# Patient Record
Sex: FEMALE | Race: WHITE | HISPANIC OR LATINO | URBAN - METROPOLITAN AREA
[De-identification: names, ages, dates, MRNs, and addresses within clinical notes are randomized per-mention and may not be internally consistent; named-entity substitution may affect disease eponyms.]

---
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
## 2022-04-11 ENCOUNTER — HISTORICAL (OUTPATIENT)
Dept: ADMINISTRATIVE | Facility: HOSPITAL | Age: 22
End: 2022-04-11

## 2022-04-27 VITALS
DIASTOLIC BLOOD PRESSURE: 68 MMHG | HEIGHT: 60 IN | WEIGHT: 188 LBS | SYSTOLIC BLOOD PRESSURE: 118 MMHG | BODY MASS INDEX: 36.91 KG/M2

## 2022-05-06 ENCOUNTER — HISTORICAL (OUTPATIENT)
Dept: ADMINISTRATIVE | Facility: HOSPITAL | Age: 22
End: 2022-05-06

## 2022-08-19 LAB
CHLAMYDIA BY PCR: NEGATIVE
M GENITALIUM DNA SPEC QL NAA+PROBE: NEGATIVE
NEISSERIA GONORRHEA BY PCR: NEGATIVE
TRICHOMONAS: NEGATIVE

## 2022-09-22 ENCOUNTER — HISTORICAL (OUTPATIENT)
Dept: ADMINISTRATIVE | Facility: HOSPITAL | Age: 22
End: 2022-09-22

## 2023-02-03 RX ORDER — MEDROXYPROGESTERONE ACETATE 150 MG/ML
INJECTION, SUSPENSION INTRAMUSCULAR
COMMUNITY
End: 2023-04-25

## 2023-02-07 ENCOUNTER — CLINICAL SUPPORT (OUTPATIENT)
Dept: OBSTETRICS AND GYNECOLOGY | Facility: CLINIC | Age: 23
End: 2023-02-07
Payer: COMMERCIAL

## 2023-02-07 VITALS
DIASTOLIC BLOOD PRESSURE: 72 MMHG | BODY MASS INDEX: 34.36 KG/M2 | TEMPERATURE: 98 F | HEIGHT: 64 IN | WEIGHT: 201.25 LBS | SYSTOLIC BLOOD PRESSURE: 118 MMHG

## 2023-02-07 DIAGNOSIS — Z30.9 ENCOUNTER FOR CONTRACEPTIVE MANAGEMENT, UNSPECIFIED TYPE: Primary | ICD-10-CM

## 2023-02-07 DIAGNOSIS — L72.9 CYST OF BUTTOCKS: ICD-10-CM

## 2023-02-07 PROCEDURE — 96372 PR INJECTION,THERAP/PROPH/DIAG2ST, IM OR SUBCUT: ICD-10-PCS | Mod: ,,, | Performed by: NURSE PRACTITIONER

## 2023-02-07 PROCEDURE — 99213 PR OFFICE/OUTPT VISIT, EST, LEVL III, 20-29 MIN: ICD-10-PCS | Mod: 25,,, | Performed by: NURSE PRACTITIONER

## 2023-02-07 PROCEDURE — 99213 OFFICE O/P EST LOW 20 MIN: CPT | Mod: 25,,, | Performed by: NURSE PRACTITIONER

## 2023-02-07 PROCEDURE — 96372 THER/PROPH/DIAG INJ SC/IM: CPT | Mod: ,,, | Performed by: NURSE PRACTITIONER

## 2023-02-07 RX ORDER — MEDROXYPROGESTERONE ACETATE 150 MG/ML
150 INJECTION, SUSPENSION INTRAMUSCULAR
Status: COMPLETED | OUTPATIENT
Start: 2023-02-07 | End: 2023-02-07

## 2023-02-07 RX ADMIN — MEDROXYPROGESTERONE ACETATE 150 MG: 150 INJECTION, SUSPENSION INTRAMUSCULAR at 08:02

## 2023-02-07 NOTE — PROGRESS NOTES
Chief Complaint:     Chief Complaint   Patient presents with    depo and c/o bump on bottom that has been hurting x2 weeks         HPI:   22 y.o.  female    presents for Depo Provera. Last injection 2022.  Patient complains to inner right buttock noted 2 weeks ago.  She is unsure when the bump originally appeared.  She denies pain to the area at this time.          Current Outpatient Medications:     medroxyPROGESTERone (DEPO-PROVERA) 150 mg/mL Syrg, , Disp: , Rfl:   No current facility-administered medications for this visit.    Review of patient's allergies indicates:  No Known Allergies    Social History     Tobacco Use    Smoking status: Never    Smokeless tobacco: Never   Substance Use Topics    Alcohol use: Yes    Drug use: Never       Review of Systems       Physical Exam:   Vitals:    23 0811   BP: 118/72   Temp: 98.2 °F (36.8 °C)     Body mass index is 34.79 kg/m².    Chaperone present.    Constitutional: General appearance, healthy, well-nourished and well-developed.  Psychiatric: Orientation to time, place, and person.         Mood and Affect: normal mood and affect and active, alert.    Buttock, right - 1 cm cyst noted right upper buttock near upper area of intergluteal cleft, firm, no drainage noted    Assessment:     There is no problem list on file for this patient.      Health Maintenance Due   Topic Date Due    Hepatitis C Screening  Never done    Lipid Panel  Never done    HPV Vaccines (1 - 2-dose series) Never done    HIV Screening  Never done    TETANUS VACCINE  Never done    COVID-19 Vaccine (2 - Pfizer series) 2022    Influenza Vaccine (1) 2022     The patient has no Health Maintenance topics of status Not Due        Plan:    Zora was seen today for depo and c/o bump on bottom that has been hurting x2 weeks.    Diagnoses and all orders for this visit:    Encounter for contraceptive management, unspecified type  -     medroxyPROGESTERone (DEPO-PROVERA)  injection 150 mg  RTC 12 weeks for depo provera injection  Cyst of buttocks  Refer to Dr Elvin Palomo for evaluation and management

## 2023-02-09 ENCOUNTER — TELEPHONE (OUTPATIENT)
Dept: OBSTETRICS AND GYNECOLOGY | Facility: CLINIC | Age: 23
End: 2023-02-09
Payer: COMMERCIAL

## 2023-02-09 NOTE — TELEPHONE ENCOUNTER
----- Message from Ericka Evans LPN sent at 2/9/2023  9:10 AM CST -----  Regarding: Referral to Dr. Stacia Molina For cyst to buttock scheduled for 2/20/23 @ 0630.

## 2023-03-10 ENCOUNTER — TELEPHONE (OUTPATIENT)
Dept: OBSTETRICS AND GYNECOLOGY | Facility: CLINIC | Age: 23
End: 2023-03-10
Payer: COMMERCIAL

## 2023-03-10 NOTE — TELEPHONE ENCOUNTER
Called Dr. Chauhan's officeStatus of appt. on 2/20/23 @ 1979 for cyst on buttocks. Radha states patient cancelled appointment and states she did not want to reschedule

## 2023-04-25 ENCOUNTER — CLINICAL SUPPORT (OUTPATIENT)
Dept: OBSTETRICS AND GYNECOLOGY | Facility: CLINIC | Age: 23
End: 2023-04-25
Payer: COMMERCIAL

## 2023-04-25 VITALS
TEMPERATURE: 96 F | SYSTOLIC BLOOD PRESSURE: 118 MMHG | BODY MASS INDEX: 35.83 KG/M2 | WEIGHT: 209.88 LBS | HEIGHT: 64 IN | DIASTOLIC BLOOD PRESSURE: 72 MMHG

## 2023-04-25 DIAGNOSIS — Z30.011 INITIATION OF ORAL CONTRACEPTION: ICD-10-CM

## 2023-04-25 DIAGNOSIS — Z30.9 ENCOUNTER FOR CONTRACEPTIVE MANAGEMENT, UNSPECIFIED TYPE: Primary | ICD-10-CM

## 2023-04-25 LAB
B-HCG UR QL: NEGATIVE
CTP QC/QA: YES

## 2023-04-25 PROCEDURE — 81025 POCT URINE PREGNANCY: ICD-10-PCS | Mod: ,,, | Performed by: NURSE PRACTITIONER

## 2023-04-25 PROCEDURE — 99212 OFFICE O/P EST SF 10 MIN: CPT | Mod: ,,, | Performed by: NURSE PRACTITIONER

## 2023-04-25 PROCEDURE — 81025 URINE PREGNANCY TEST: CPT | Mod: ,,, | Performed by: NURSE PRACTITIONER

## 2023-04-25 PROCEDURE — 99212 PR OFFICE/OUTPT VISIT, EST, LEVL II, 10-19 MIN: ICD-10-PCS | Mod: ,,, | Performed by: NURSE PRACTITIONER

## 2023-04-25 RX ORDER — NORETHINDRONE ACETATE AND ETHINYL ESTRADIOL AND FERROUS FUMARATE 1MG-20(24)
1 KIT ORAL DAILY
Qty: 28 TABLET | Refills: 3 | Status: SHIPPED | OUTPATIENT
Start: 2023-04-25 | End: 2023-05-23

## 2023-04-25 NOTE — PROGRESS NOTES
"Chief Complaint:     Chief Complaint   Patient presents with    Contraception     Discuss stopping DMPA          HPI:   22 y.o.  White female  G0 presents with request to change to pills for birth control.  Has taken pills in past with no problems.  Nonsmoker, denies hx of CVA, MI, TE event.       LMP: Amenorrhea  Frequency: n/a   Cycle Length: n/a days   Flow: n/a   Intermenstrual Bleeding: No  Postcoital Bleeding: No  Dysmenorrhea: No  Sexually Active: Yes    Dyspareunia: No  Contraception: DMPA   H/o STI: HSV  Last pap: N/a  H/o Abnormal Pap: No   Gardasil: no hx    MMG: n/a  H/o abnl MMG      Current Outpatient Medications:     norethindrone-e.estradioL-iron (MINASTRIN 24 FE) 1 mg-20 mcg(24) /75 mg (4) Chew, Take 1 tablet by mouth once daily., Disp: 28 tablet, Rfl: 3    Review of patient's allergies indicates:  No Known Allergies    Social History     Tobacco Use    Smoking status: Never    Smokeless tobacco: Never   Substance Use Topics    Alcohol use: Yes    Drug use: Never       Review of Systems   Constitutional:  Negative for appetite change, chills, fatigue, fever and unexpected weight change.   Gastrointestinal:  Negative for abdominal pain, blood in stool, constipation, diarrhea, nausea, vomiting and reflux.   Genitourinary:  Negative for bladder incontinence, decreased libido, dysmenorrhea, dyspareunia, dysuria, flank pain, frequency, genital sores, hematuria, hot flashes, menorrhagia, menstrual problem, pelvic pain, urgency, vaginal bleeding, vaginal discharge, vaginal pain, urinary incontinence, postcoital bleeding, postmenopausal bleeding, vaginal dryness and vaginal odor.   Integumentary:  Negative for rash, acne, hair changes and mole/lesion.   Neurological:  Negative for headaches.        Physical Exam:   Vitals:    04/25/23 1310   BP: 118/72   Temp: 96.4 °F (35.8 °C)   Weight: 95.2 kg (209 lb 14.1 oz)   Height: 5' 3.78" (1.62 m)       Body mass index is 36.27 kg/m².    Physical " Exam  Constitutional:       Appearance: She is well-developed.   Abdominal:      General: Abdomen is flat.   Neurological:      Mental Status: She is alert and oriented to person, place, and time.   Psychiatric:         Attention and Perception: Attention normal.         Mood and Affect: Mood normal. Mood is not anxious or depressed.           Assessment:     There is no problem list on file for this patient.      Health Maintenance Due   Topic Date Due    Hepatitis C Screening  Never done    HPV Vaccines (1 - 2-dose series) Never done    HIV Screening  Never done    TETANUS VACCINE  Never done    COVID-19 Vaccine (2 - Pfizer series) 08/17/2022    Influenza Vaccine (1) 09/01/2022     Health Maintenance Topics with due status: Not Due       Topic Last Completion Date    Chlamydia Screening 08/19/2022           Plan:    Zora was seen today for contraception.    Diagnoses and all orders for this visit:    Encounter for contraceptive management, unspecified type  -     POCT Urine Pregnancy    Initiation of oral contraception  - Explained common options for contraception including natural family planning, barrier methods, depo-provera, ocps,  patch, nuvaring, IUD, Nexplanon, and sterilization.     - Discussed that pills should be taken at the same time every day to minimize breakthrough bleeding and to decrease failure rate.     - Advised patient that smoking is harmful due to increased risks of stroke, heart attack and blood clots when taking pills. Patient to contact us immediately if she experiences severe abdominal pain, severe chest pain, severe headaches, eye-visual changes, severe leg pain or SOB.     - Discussed that birth control, such as pills, Nuva Ring , Patch or Depo Provera, Nexplanon, IUDs do not protect against STDs.     Begin Minastrin today.  Refills sent to pharmacy.      Other orders  -     norethindrone-e.estradioL-iron (MINASTRIN 24 FE) 1 mg-20 mcg(24) /75 mg (4) Chew; Take 1 tablet by mouth once  daily.

## 2023-07-19 NOTE — PROGRESS NOTES
Po  Chief Complaint:     Chief Complaint   Patient presents with    Contraception     F/u on ocp         HPI:   22 y.o.  Presents to follow up on Minastrin.  C/o nausea, fatigue, lower back pain, irregular bleeding, moodiness, and cramping while taking Minastrin. Pt reports she has taken UPTs at home, all have been negative, last one 3 days ago, last had intercourse x3 days go as well.  Pt desires to restart Depo Provera injections.     LMP: irregular with OCP  Frequency: n/a   Cycle Length: n/a days   Flow: n/a   Intermenstrual Bleeding: yes  Postcoital Bleeding: No  Dysmenorrhea: No  Sexually Active: Yes    Dyspareunia: yes  Contraception: OCP,Minastrin  H/o STI: HSV  Last pap: N/a  H/o Abnormal Pap: No   Gardasil: no hx    MMG: n/a  H/o abnl MMG: n/a      No current outpatient medications on file.  No current facility-administered medications for this visit.    Review of patient's allergies indicates:  No Known Allergies    Social History     Tobacco Use    Smoking status: Never    Smokeless tobacco: Never   Substance Use Topics    Alcohol use: Yes     Comment: ocassional    Drug use: Never       Review of Systems:   General/Constitutional: Chills denies. Fatigue/weakness denies. Fever denies. Night sweats denies. Hot flashes denies    Respiratory: Cough denies. Hemoptysis denies. SOB denies. Sputum production denies. Wheezing denies .   Cardiovascular: Chest pain denies . Dizziness denies. Palpitations denies. Swelling in hands/feet denies    Gastrointestinal: Abdominal pain denies. Blood in stool denies. Constipation denies. Diarrhea denies. Heartburn denies. Nausea denies. Vomiting denies    Genitourinary: Incontinence denies. Blood in urine denies. Frequent urination denies. Painful urination denies. Urinary urgency denies. Nocturia denies    Gynecologic: Irregular menses admits Heavy bleeding denies. Painful menses admits. Vaginal discharge denies. Vaginal odor denies. Vaginal itching denies. Vaginal  lesion denies. Pelvic pain denies. Decreased libido denies. Vulvar lesion denies. Prolapse of genital organs denies. Painful intercourse admits. Postcoital bleeding denies    Psychiatric: Depression denies. Anxiety denies       Physical Exam:   Vitals:    07/20/23 1435   BP: 118/82   BP Location: Right arm   Patient Position: Sitting   Temp: 96.8 °F (36 °C)   Weight: 94.6 kg (208 lb 8.9 oz)   Height: 5' (1.524 m)       Body mass index is 40.73 kg/m².    Constitutional:       Appearance: She is well-developed  Abdominal:       General: Abdomen is flat.  Neurological:        Mental Status: She is alert and oriented to person, place and time.   Psychiatric:       Attention and Perception: Attention normal.       Mood and Affect: Mood normal. Mood is not anxious or depressed.       Assessment:     There is no problem list on file for this patient.      Health Maintenance Due   Topic Date Due    Hepatitis C Screening  Never done    HPV Vaccines (1 - 2-dose series) Never done    HIV Screening  Never done    TETANUS VACCINE  Never done    COVID-19 Vaccine (2 - Pfizer series) 09/21/2022     Health Maintenance Topics with due status: Not Due       Topic Last Completion Date    Influenza Vaccine 09/24/2019    Chlamydia Screening 08/19/2022           Plan:    Zora was seen today for contraception.    Diagnoses and all orders for this visit:    Oral contraceptive use  Discontinue Minastrin    Encounter for contraceptive management, unspecified type  - Explained common options for contraception including natural family planning, barrier methods, depo-provera, ocps,  patch, nuvaring, IUD, Nexplanon, and sterilization.     - Discussed that birth control, such as pills, Nuva Ring , Patch or Depo Provera, Nexplanon, IUDs do not protect against STDs.     Pt desires  Depo provera    Need for HPV vaccination  - HPV is a common viral infection that manifests in some patients as anogenital warts.      - HPV infection is acquired  through direct genital contact.     - Educated she may be at risk for other sexually transmitted diseases     - Advised pt on Gardasil vaccine and correct doses to be administered if pt desires.     Depo Provera Contraceptive status  - DMPA injection given by nurse, tolerated well    - Desires to cont DMPA at this time    - Cont DMPA q 12 weeks    - RTC 3 months for DMPA injection between 10/05/2023 to 10/19/2023

## 2023-07-20 ENCOUNTER — OFFICE VISIT (OUTPATIENT)
Dept: OBSTETRICS AND GYNECOLOGY | Facility: CLINIC | Age: 23
End: 2023-07-20
Payer: COMMERCIAL

## 2023-07-20 VITALS
WEIGHT: 208.56 LBS | TEMPERATURE: 97 F | HEIGHT: 60 IN | DIASTOLIC BLOOD PRESSURE: 82 MMHG | SYSTOLIC BLOOD PRESSURE: 118 MMHG | BODY MASS INDEX: 40.95 KG/M2

## 2023-07-20 DIAGNOSIS — Z30.41 ORAL CONTRACEPTIVE USE: Primary | ICD-10-CM

## 2023-07-20 DIAGNOSIS — Z30.9 ENCOUNTER FOR CONTRACEPTIVE MANAGEMENT, UNSPECIFIED TYPE: ICD-10-CM

## 2023-07-20 DIAGNOSIS — Z30.42 DEPO-PROVERA CONTRACEPTIVE STATUS: ICD-10-CM

## 2023-07-20 DIAGNOSIS — Z23 NEED FOR HPV VACCINATION: ICD-10-CM

## 2023-07-20 PROCEDURE — 3074F SYST BP LT 130 MM HG: CPT | Mod: CPTII,,, | Performed by: NURSE PRACTITIONER

## 2023-07-20 PROCEDURE — 99213 OFFICE O/P EST LOW 20 MIN: CPT | Mod: 25,,, | Performed by: NURSE PRACTITIONER

## 2023-07-20 PROCEDURE — 1159F PR MEDICATION LIST DOCUMENTED IN MEDICAL RECORD: ICD-10-PCS | Mod: CPTII,,, | Performed by: NURSE PRACTITIONER

## 2023-07-20 PROCEDURE — 3079F PR MOST RECENT DIASTOLIC BLOOD PRESSURE 80-89 MM HG: ICD-10-PCS | Mod: CPTII,,, | Performed by: NURSE PRACTITIONER

## 2023-07-20 PROCEDURE — 96372 THER/PROPH/DIAG INJ SC/IM: CPT | Mod: ,,, | Performed by: NURSE PRACTITIONER

## 2023-07-20 PROCEDURE — 96372 PR INJECTION,THERAP/PROPH/DIAG2ST, IM OR SUBCUT: ICD-10-PCS | Mod: ,,, | Performed by: NURSE PRACTITIONER

## 2023-07-20 PROCEDURE — 3079F DIAST BP 80-89 MM HG: CPT | Mod: CPTII,,, | Performed by: NURSE PRACTITIONER

## 2023-07-20 PROCEDURE — 3074F PR MOST RECENT SYSTOLIC BLOOD PRESSURE < 130 MM HG: ICD-10-PCS | Mod: CPTII,,, | Performed by: NURSE PRACTITIONER

## 2023-07-20 PROCEDURE — 1159F MED LIST DOCD IN RCRD: CPT | Mod: CPTII,,, | Performed by: NURSE PRACTITIONER

## 2023-07-20 PROCEDURE — 99213 PR OFFICE/OUTPT VISIT, EST, LEVL III, 20-29 MIN: ICD-10-PCS | Mod: 25,,, | Performed by: NURSE PRACTITIONER

## 2023-07-20 PROCEDURE — 1160F RVW MEDS BY RX/DR IN RCRD: CPT | Mod: CPTII,,, | Performed by: NURSE PRACTITIONER

## 2023-07-20 PROCEDURE — 3008F PR BODY MASS INDEX (BMI) DOCUMENTED: ICD-10-PCS | Mod: CPTII,,, | Performed by: NURSE PRACTITIONER

## 2023-07-20 PROCEDURE — 3008F BODY MASS INDEX DOCD: CPT | Mod: CPTII,,, | Performed by: NURSE PRACTITIONER

## 2023-07-20 PROCEDURE — 1160F PR REVIEW ALL MEDS BY PRESCRIBER/CLIN PHARMACIST DOCUMENTED: ICD-10-PCS | Mod: CPTII,,, | Performed by: NURSE PRACTITIONER

## 2023-07-20 RX ORDER — NORETHINDRONE ACETATE AND ETHINYL ESTRADIOL AND FERROUS FUMARATE 1MG-20(24)
1 KIT ORAL
COMMUNITY
Start: 2023-06-23 | End: 2023-07-20 | Stop reason: SDUPTHER

## 2023-07-20 RX ORDER — MEDROXYPROGESTERONE ACETATE 150 MG/ML
150 INJECTION, SUSPENSION INTRAMUSCULAR
Status: COMPLETED | OUTPATIENT
Start: 2023-07-20 | End: 2023-07-20

## 2023-07-20 RX ORDER — MEDROXYPROGESTERONE ACETATE 150 MG/ML
150 INJECTION, SUSPENSION INTRAMUSCULAR ONCE
Qty: 1 ML | Refills: 0 | Status: SHIPPED | OUTPATIENT
Start: 2023-07-20 | End: 2023-07-20 | Stop reason: CLARIF

## 2023-07-20 RX ADMIN — MEDROXYPROGESTERONE ACETATE 150 MG: 150 INJECTION, SUSPENSION INTRAMUSCULAR at 03:07

## 2023-10-23 ENCOUNTER — OFFICE VISIT (OUTPATIENT)
Dept: OBSTETRICS AND GYNECOLOGY | Facility: CLINIC | Age: 23
End: 2023-10-23
Payer: COMMERCIAL

## 2023-10-23 VITALS
SYSTOLIC BLOOD PRESSURE: 110 MMHG | WEIGHT: 212.5 LBS | HEIGHT: 60 IN | DIASTOLIC BLOOD PRESSURE: 68 MMHG | TEMPERATURE: 97 F | BODY MASS INDEX: 41.72 KG/M2

## 2023-10-23 DIAGNOSIS — Z30.42 DEPO-PROVERA CONTRACEPTIVE STATUS: Primary | ICD-10-CM

## 2023-10-23 LAB
B-HCG UR QL: NEGATIVE
CTP QC/QA: YES

## 2023-10-23 PROCEDURE — 96372 THER/PROPH/DIAG INJ SC/IM: CPT | Mod: ,,, | Performed by: NURSE PRACTITIONER

## 2023-10-23 PROCEDURE — 1160F RVW MEDS BY RX/DR IN RCRD: CPT | Mod: CPTII,,, | Performed by: NURSE PRACTITIONER

## 2023-10-23 PROCEDURE — 81025 POCT URINE PREGNANCY: ICD-10-PCS | Mod: ,,, | Performed by: NURSE PRACTITIONER

## 2023-10-23 PROCEDURE — 96372 PR INJECTION,THERAP/PROPH/DIAG2ST, IM OR SUBCUT: ICD-10-PCS | Mod: ,,, | Performed by: NURSE PRACTITIONER

## 2023-10-23 PROCEDURE — 3008F BODY MASS INDEX DOCD: CPT | Mod: CPTII,,, | Performed by: NURSE PRACTITIONER

## 2023-10-23 PROCEDURE — 1159F MED LIST DOCD IN RCRD: CPT | Mod: CPTII,,, | Performed by: NURSE PRACTITIONER

## 2023-10-23 PROCEDURE — 81025 URINE PREGNANCY TEST: CPT | Mod: ,,, | Performed by: NURSE PRACTITIONER

## 2023-10-23 PROCEDURE — 3078F PR MOST RECENT DIASTOLIC BLOOD PRESSURE < 80 MM HG: ICD-10-PCS | Mod: CPTII,,, | Performed by: NURSE PRACTITIONER

## 2023-10-23 PROCEDURE — 1160F PR REVIEW ALL MEDS BY PRESCRIBER/CLIN PHARMACIST DOCUMENTED: ICD-10-PCS | Mod: CPTII,,, | Performed by: NURSE PRACTITIONER

## 2023-10-23 PROCEDURE — 3078F DIAST BP <80 MM HG: CPT | Mod: CPTII,,, | Performed by: NURSE PRACTITIONER

## 2023-10-23 PROCEDURE — 3074F PR MOST RECENT SYSTOLIC BLOOD PRESSURE < 130 MM HG: ICD-10-PCS | Mod: CPTII,,, | Performed by: NURSE PRACTITIONER

## 2023-10-23 PROCEDURE — 1159F PR MEDICATION LIST DOCUMENTED IN MEDICAL RECORD: ICD-10-PCS | Mod: CPTII,,, | Performed by: NURSE PRACTITIONER

## 2023-10-23 PROCEDURE — 3008F PR BODY MASS INDEX (BMI) DOCUMENTED: ICD-10-PCS | Mod: CPTII,,, | Performed by: NURSE PRACTITIONER

## 2023-10-23 PROCEDURE — 3074F SYST BP LT 130 MM HG: CPT | Mod: CPTII,,, | Performed by: NURSE PRACTITIONER

## 2023-10-23 RX ORDER — MEDROXYPROGESTERONE ACETATE 150 MG/ML
150 INJECTION, SUSPENSION INTRAMUSCULAR
COMMUNITY

## 2023-10-23 RX ORDER — MEDROXYPROGESTERONE ACETATE 150 MG/ML
150 INJECTION, SUSPENSION INTRAMUSCULAR
Status: COMPLETED | OUTPATIENT
Start: 2023-10-23 | End: 2023-10-23

## 2023-10-23 RX ADMIN — MEDROXYPROGESTERONE ACETATE 150 MG: 150 INJECTION, SUSPENSION INTRAMUSCULAR at 10:10

## 2023-10-23 NOTE — PROGRESS NOTES
Chief Complaint:  DMPA (DMPA last injection 23)    History of Present Illness:  Zora is a 23 y.o.  presents for depo provera. Last injection was 23. No cycle with DMPA. Content, desires to continue.       LMP: no cycle w/ depo provera   Frequency: n/a   Cycle Length: n/a  Flow: n/a  Intermenstrual Bleeding: No  Postcoital Bleeding: No  Dysmenorrhea: No  Sexually Active: Yes    Dyspareunia: No  Contraception: DMPA last injection 23  H/o STI: HSV  Last pap: no history  H/o Abnormal Pap: No   Gardasil: 0/3  MMG: n/a  H/o abnl MMG: n/a  Colonoscopy: n/a      Review of Systems:  Patient reports no abdominal pain. She reports no hematuria, no abnormal bleeding, no flank pain, no trouble urinating, no incontinence, no rash, no lesion, no discharge, no vaginal odor, and no vaginal itching.     OB History    Para Term  AB Living   0 0 0 0 0 0   SAB IAB Ectopic Multiple Live Births   0 0 0 0 0      The patient has never been pregnant.      Current Outpatient Medications:     medroxyPROGESTERone (DEPO-PROVERA) 150 mg/mL injection, Inject 150 mg into the muscle every 3 (three) months., Disp: , Rfl:   No current facility-administered medications for this visit.    Physical Exam:  /68   Temp 97.3 °F (36.3 °C)   Ht 5' (1.524 m)   Wt 96.4 kg (212 lb 8.4 oz)   BMI 41.51 kg/m²     Constitutional: General appearance: healthy, well-nourished and well-developed   Psychiatric: Orientation to time, place and person. Normal mood and affect and active, alert       Assessment/Plan:  1. Depo-Provera contraceptive status   Patient desires to depo-provera for contraceptive management       Patient tolerated injection       Bleeding precautions     Discussed that birth control such as oral contraceptives, patch, NuvaRing, or Depo-Provera do not protect against STDs       Follow up in 3mos

## 2023-12-12 ENCOUNTER — OFFICE VISIT (OUTPATIENT)
Dept: FAMILY MEDICINE | Facility: CLINIC | Age: 23
End: 2023-12-12
Payer: COMMERCIAL

## 2023-12-12 VITALS
HEART RATE: 105 BPM | HEIGHT: 60 IN | BODY MASS INDEX: 42.01 KG/M2 | OXYGEN SATURATION: 100 % | DIASTOLIC BLOOD PRESSURE: 70 MMHG | SYSTOLIC BLOOD PRESSURE: 119 MMHG | RESPIRATION RATE: 20 BRPM | TEMPERATURE: 98 F | WEIGHT: 214 LBS

## 2023-12-12 DIAGNOSIS — J02.9 SORE THROAT: ICD-10-CM

## 2023-12-12 DIAGNOSIS — Z02.0 SCHOOL PHYSICAL EXAM: ICD-10-CM

## 2023-12-12 DIAGNOSIS — Z79.899 ON LONG TERM DRUG THERAPY: ICD-10-CM

## 2023-12-12 DIAGNOSIS — E55.9 VITAMIN D DEFICIENCY: ICD-10-CM

## 2023-12-12 DIAGNOSIS — J32.9 SINUSITIS, UNSPECIFIED CHRONICITY, UNSPECIFIED LOCATION: Primary | ICD-10-CM

## 2023-12-12 DIAGNOSIS — Z13.9 SCREENING DUE: ICD-10-CM

## 2023-12-12 DIAGNOSIS — Z83.438 FAMILY HISTORY OF HYPERLIPIDEMIA: ICD-10-CM

## 2023-12-12 LAB
CTP QC/QA: YES
CTP QC/QA: YES
S PYO RRNA THROAT QL PROBE: NEGATIVE
SARS-COV-2 AG RESP QL IA.RAPID: NEGATIVE

## 2023-12-12 PROCEDURE — 3074F SYST BP LT 130 MM HG: CPT | Mod: CPTII,,, | Performed by: NURSE PRACTITIONER

## 2023-12-12 PROCEDURE — 3078F PR MOST RECENT DIASTOLIC BLOOD PRESSURE < 80 MM HG: ICD-10-PCS | Mod: CPTII,,, | Performed by: NURSE PRACTITIONER

## 2023-12-12 PROCEDURE — 87880 STREP A ASSAY W/OPTIC: CPT | Mod: QW,,, | Performed by: NURSE PRACTITIONER

## 2023-12-12 PROCEDURE — 1159F PR MEDICATION LIST DOCUMENTED IN MEDICAL RECORD: ICD-10-PCS | Mod: CPTII,,, | Performed by: NURSE PRACTITIONER

## 2023-12-12 PROCEDURE — 99213 OFFICE O/P EST LOW 20 MIN: CPT | Mod: ,,, | Performed by: NURSE PRACTITIONER

## 2023-12-12 PROCEDURE — 87811 SARS-COV-2 COVID19 W/OPTIC: CPT | Mod: QW,,, | Performed by: NURSE PRACTITIONER

## 2023-12-12 PROCEDURE — 87811 SARS CORONAVIRUS 2 ANTIGEN POCT, MANUAL READ: ICD-10-PCS | Mod: QW,,, | Performed by: NURSE PRACTITIONER

## 2023-12-12 PROCEDURE — 1160F PR REVIEW ALL MEDS BY PRESCRIBER/CLIN PHARMACIST DOCUMENTED: ICD-10-PCS | Mod: CPTII,,, | Performed by: NURSE PRACTITIONER

## 2023-12-12 PROCEDURE — 86580 TB INTRADERMAL TEST: CPT | Mod: ,,, | Performed by: NURSE PRACTITIONER

## 2023-12-12 PROCEDURE — 86580 POCT TB SKIN TEST: ICD-10-PCS | Mod: ,,, | Performed by: NURSE PRACTITIONER

## 2023-12-12 PROCEDURE — 3008F PR BODY MASS INDEX (BMI) DOCUMENTED: ICD-10-PCS | Mod: CPTII,,, | Performed by: NURSE PRACTITIONER

## 2023-12-12 PROCEDURE — 3008F BODY MASS INDEX DOCD: CPT | Mod: CPTII,,, | Performed by: NURSE PRACTITIONER

## 2023-12-12 PROCEDURE — 3074F PR MOST RECENT SYSTOLIC BLOOD PRESSURE < 130 MM HG: ICD-10-PCS | Mod: CPTII,,, | Performed by: NURSE PRACTITIONER

## 2023-12-12 PROCEDURE — 1160F RVW MEDS BY RX/DR IN RCRD: CPT | Mod: CPTII,,, | Performed by: NURSE PRACTITIONER

## 2023-12-12 PROCEDURE — 99213 PR OFFICE/OUTPT VISIT, EST, LEVL III, 20-29 MIN: ICD-10-PCS | Mod: ,,, | Performed by: NURSE PRACTITIONER

## 2023-12-12 PROCEDURE — 87880 POCT RAPID STREP A: ICD-10-PCS | Mod: QW,,, | Performed by: NURSE PRACTITIONER

## 2023-12-12 PROCEDURE — 3078F DIAST BP <80 MM HG: CPT | Mod: CPTII,,, | Performed by: NURSE PRACTITIONER

## 2023-12-12 PROCEDURE — 1159F MED LIST DOCD IN RCRD: CPT | Mod: CPTII,,, | Performed by: NURSE PRACTITIONER

## 2023-12-12 RX ORDER — AMOXICILLIN 875 MG/1
875 TABLET, FILM COATED ORAL EVERY 12 HOURS
Qty: 14 TABLET | Refills: 0 | Status: SHIPPED | OUTPATIENT
Start: 2023-12-12 | End: 2023-12-19

## 2023-12-12 RX ORDER — PSEUDOEPHEDRINE HYDROCHLORIDE 60 MG/1
60 TABLET ORAL EVERY 6 HOURS PRN
Qty: 30 TABLET | Refills: 0 | Status: SHIPPED | OUTPATIENT
Start: 2023-12-12 | End: 2023-12-22

## 2023-12-12 NOTE — PROGRESS NOTES
SUBJECTIVE:  Zora Gallardo is a 23 y.o. female here alone for Cough (Pt here due to sinus pressure, sore throat and cough. Also needs TB test )      HPI  Patient presents for a work physical with Tdap and TB test. She is getting Tdap vaccine at [facility] on Friday. She is with no history of asthma, heart conditions, GI issues, or other pulmonary issues. She does wear glasses. UTD on eye exam. UTD on gyn exam. Taking only depo shot with no negative side effects. She does work nights and does have sleeping problems.     zora's allergies, medications, history, and problem list were updated as appropriate.    Review of Systems   Constitutional:  Positive for fatigue.      A comprehensive review of symptoms was completed and negative except as noted above.    OBJECTIVE:  Vital signs  Vitals:    12/12/23 1253   BP: 119/70   BP Location: Right arm   Patient Position: Sitting   Pulse: 105   Resp: 20   Temp: 97.9 °F (36.6 °C)   SpO2: 100%   Weight: 97.1 kg (214 lb)   Height: 5' (1.524 m)        Physical Exam  Vitals and nursing note reviewed.   Constitutional:       Appearance: Normal appearance.   HENT:      Head: Normocephalic and atraumatic.      Right Ear: Tympanic membrane, ear canal and external ear normal.      Left Ear: Tympanic membrane, ear canal and external ear normal.      Nose: Nose normal.      Mouth/Throat:      Mouth: Mucous membranes are moist.      Pharynx: Oropharynx is clear.   Eyes:      Extraocular Movements: Extraocular movements intact.      Conjunctiva/sclera: Conjunctivae normal.      Pupils: Pupils are equal, round, and reactive to light.   Cardiovascular:      Rate and Rhythm: Normal rate and regular rhythm.      Pulses: Normal pulses.      Heart sounds: Normal heart sounds.   Pulmonary:      Effort: Pulmonary effort is normal.      Breath sounds: Normal breath sounds.   Abdominal:      General: Abdomen is flat. Bowel sounds are normal.      Palpations: Abdomen is soft.    Musculoskeletal:         General: Normal range of motion.      Cervical back: Normal range of motion.   Skin:     General: Skin is warm and dry.      Capillary Refill: Capillary refill takes less than 2 seconds.   Neurological:      General: No focal deficit present.      Mental Status: She is alert.   Psychiatric:         Mood and Affect: Mood normal.         Behavior: Behavior normal.         Thought Content: Thought content normal.         Judgment: Judgment normal.          Office Visit on 12/12/2023   Component Date Value Ref Range Status    Rapid Strep A Screen 12/12/2023 Negative  Negative, Positive Slide, Positive Tube Final     Acceptable 12/12/2023 Yes   Final    SARS Coronavirus 2 Antigen 12/12/2023 Negative  Negative Final     Acceptable 12/12/2023 Yes   Final          ASSESSMENT/PLAN:    1. Sinusitis, unspecified chronicity, unspecified location  Assessment & Plan:  Covid and strep negative, refused flu   Sudogest and ibuprofen and tylenol prn   Amoxicillin if no improvement within 2-3days    Orders:  -     pseudoephedrine (SUDOGEST) 60 MG tablet; Take 1 tablet (60 mg total) by mouth every 6 (six) hours as needed for Congestion.  Dispense: 30 tablet; Refill: 0  -     amoxicillin (AMOXIL) 875 MG tablet; Take 1 tablet (875 mg total) by mouth every 12 (twelve) hours. for 7 days  Dispense: 14 tablet; Refill: 0    2. School physical exam  Assessment & Plan:  Return on Thursday read TB test   Healthy female   No medication with exception to birth control   Getting Tdap on Friday         3. Sore throat  -     POCT Rapid Strep A  -     SARS Coronavirus 2 Antigen, POCT Manual Read    4. Screening due  -     Drug Screen, Urine          Recent Results (from the past 24 hour(s))   POCT Rapid Strep A    Collection Time: 12/12/23  1:16 PM   Result Value Ref Range    Rapid Strep A Screen Negative Negative, Positive Slide, Positive Tube     Acceptable Yes    SARS  Coronavirus 2 Antigen, POCT Manual Read    Collection Time: 12/12/23  1:16 PM   Result Value Ref Range    SARS Coronavirus 2 Antigen Negative Negative     Acceptable Yes      Cbc, cmp, flp, tsh, A1c, vitamin d, urine drug screening on Thursday at hospital     Follow Up:  Follow up if symptoms worsen or fail to improve.      Discussed the diagnosis, prognosis, plan of care, chronic nature of and indications for, risks and benefits of treatment for conditions.  Continue all medications as prescribed unless otherwise noted.   Call if patient develops other symptoms or if not better in 2-3 days and sooner if worse. Emphasized the importance of compliance with all recommendations, including medication use and timely follow up. Instructed to return as noted be or sooner if patient develops any other problems or if there are any other additional questions or concerns.

## 2023-12-12 NOTE — LETTER
December 14, 2023      [facility]  [address] [place] [id number]  Phone: [phone]  Fax: [phone]       Patient: Zora Gallardo   YOB: 2000  Date of Visit: 12/14/2023    To Whom It May Concern:    lito Gallardo  was at [facility] on 12/14/2023. The patient may return to work on 12/18/2023 with no restrictions. If you have any questions or concerns, or if I can be of further assistance, please do not hesitate to contact me.    Sincerely,    Mae Camp LPN

## 2023-12-12 NOTE — ASSESSMENT & PLAN NOTE
Covid and strep negative, refused flu   Sudogest and ibuprofen and tylenol prn   Amoxicillin if no improvement within 2-3days

## 2023-12-12 NOTE — ASSESSMENT & PLAN NOTE
Return on Thursday read TB test   Healthy female   No medication with exception to birth control   Getting Tdap on Friday

## 2024-01-11 NOTE — PROGRESS NOTES
Chief Complaint:     Chief Complaint   Patient presents with    Contraception     Has cycle when time for DEPO LMP 1/10/24.          HPI:   23 y.o.  F   presents for Depo Provera. Last injection 10/23/2023. States has a 4 day cycle when injection is due.     LMP: 1/10/2024   Frequency: Every 3 months  Cycle Length: 4  Flow: Moderate  Intermenstrual Bleeding: No  Postcoital Bleeding: No  Dysmenorrhea: No  Sexually Active: Yes    Dyspareunia: No  Contraception: DMPA last injection 2024  H/o STI: HSV  Last pap: no history  H/o Abnormal Pap: No   Gardasil: 0/3  MMG: n/a  H/o abnl MMG: n/a  Colonoscopy: n/a        Current Outpatient Medications:     medroxyPROGESTERone (DEPO-PROVERA) 150 mg/mL injection, Inject 150 mg into the muscle every 3 (three) months., Disp: , Rfl:     Review of patient's allergies indicates:  No Known Allergies    Social History     Tobacco Use    Smoking status: Every Day     Types: Vaping with nicotine    Smokeless tobacco: Never   Substance Use Topics    Alcohol use: Yes     Comment: ocassional    Drug use: Never         Review of Systems:  General/Constitutional:  Chills denies. Fatigue/weakness denies. Fever denies . Night sweats denies .  Gastrointestinal:  Abdominal pain denies. Blood in stool denies. Constipation denies. Diarrhea denies. Heartburn denies. Nausea denies. Vomiting denies  Genitourinary:  Incontinence denies. Blood in urine denies. Frequent urination denies. Urgency denies. Painful urination denies.  Gynecologic:  Irregular menses denies. Heavy bleeding denies. Painful menses denies. Vaginal discharge denies. Vaginal odor denies. Vaginal lesion denies. Pelvic pain denies. Decreased libido denies. Vulvar lesion denies. Prolapse of genital organs denies. Painful intercourse denies.      Physical Exam:   Vitals:    24 1205   BP: 120/70     Body mass index is 42.11 kg/m².    Chaperone present.    Constitutional: General appearance, healthy, well-nourished and  well-developed.  Psychiatric: Orientation to time, place, and person.         Mood and Affect: normal mood and affect and active, alert.    Assessment:     Patient Active Problem List   Diagnosis    Sinusitis    School physical exam       Health Maintenance Due   Topic Date Due    Hepatitis C Screening  Never done    Pneumococcal Vaccines (Age 0-64) (1 - PCV) 10/18/2008    HPV Vaccines (1 - 2-dose series) Never done    HIV Screening  Never done    TETANUS VACCINE  Never done    Chlamydia Screening  08/19/2023    Pap Smear  Never done     The patient has no Health Maintenance topics of status Not Due        Plan:    Zora was seen today for contraception.    Diagnoses and all orders for this visit:    Encounter for management and injection of depo-Provera  - DMPA injection given by nurse, tolerated well    - Desires to cont DMPA at this time    - Cont DMPA q 12 weeks    - RTC 3 months for DMPA injection

## 2024-01-12 ENCOUNTER — OFFICE VISIT (OUTPATIENT)
Dept: OBSTETRICS AND GYNECOLOGY | Facility: CLINIC | Age: 24
End: 2024-01-12
Payer: COMMERCIAL

## 2024-01-12 VITALS
DIASTOLIC BLOOD PRESSURE: 70 MMHG | SYSTOLIC BLOOD PRESSURE: 120 MMHG | HEIGHT: 60 IN | WEIGHT: 215.63 LBS | BODY MASS INDEX: 42.33 KG/M2

## 2024-01-12 DIAGNOSIS — Z30.42 ENCOUNTER FOR MANAGEMENT AND INJECTION OF DEPO-PROVERA: Primary | ICD-10-CM

## 2024-01-12 PROCEDURE — 96372 THER/PROPH/DIAG INJ SC/IM: CPT | Mod: ,,, | Performed by: NURSE PRACTITIONER

## 2024-01-12 PROCEDURE — 3078F DIAST BP <80 MM HG: CPT | Mod: CPTII,,, | Performed by: NURSE PRACTITIONER

## 2024-01-12 PROCEDURE — 3008F BODY MASS INDEX DOCD: CPT | Mod: CPTII,,, | Performed by: NURSE PRACTITIONER

## 2024-01-12 PROCEDURE — 3074F SYST BP LT 130 MM HG: CPT | Mod: CPTII,,, | Performed by: NURSE PRACTITIONER

## 2024-01-12 PROCEDURE — 1160F RVW MEDS BY RX/DR IN RCRD: CPT | Mod: CPTII,,, | Performed by: NURSE PRACTITIONER

## 2024-01-12 PROCEDURE — 1159F MED LIST DOCD IN RCRD: CPT | Mod: CPTII,,, | Performed by: NURSE PRACTITIONER

## 2024-01-12 RX ORDER — MEDROXYPROGESTERONE ACETATE 150 MG/ML
150 INJECTION, SUSPENSION INTRAMUSCULAR
Status: COMPLETED | OUTPATIENT
Start: 2024-01-12 | End: 2024-01-12

## 2024-01-12 RX ORDER — MEDROXYPROGESTERONE ACETATE 150 MG/ML
150 INJECTION, SUSPENSION INTRAMUSCULAR ONCE
Qty: 1 ML | Refills: 0 | Status: SHIPPED | OUTPATIENT
Start: 2024-01-12 | End: 2024-01-12 | Stop reason: CLARIF

## 2024-01-12 RX ADMIN — MEDROXYPROGESTERONE ACETATE 150 MG: 150 INJECTION, SUSPENSION INTRAMUSCULAR at 12:01

## 2024-03-20 NOTE — PROGRESS NOTES
Chief Complaint:     No chief complaint on file.        HPI:   23 y.o.  F   presents for Depo Provera. Last injection 2024.      LMP: 1/10/2024   Frequency: Every 3 months  Cycle Length: 4  Flow: Moderate  Intermenstrual Bleeding: No  Postcoital Bleeding: No  Dysmenorrhea: No  Sexually Active: Yes    Dyspareunia: No  Contraception: DMPA last injection 2024  H/o STI: HSV  Last pap: no history  H/o Abnormal Pap: No   Garda        Current Outpatient Medications:     medroxyPROGESTERone (DEPO-PROVERA) 150 mg/mL injection, Inject 150 mg into the muscle every 3 (three) months., Disp: , Rfl:     Review of patient's allergies indicates:  No Known Allergies    Social History     Tobacco Use    Smoking status: Every Day     Types: Vaping with nicotine    Smokeless tobacco: Never   Substance Use Topics    Alcohol use: Yes     Comment: ocassional    Drug use: Never         Review of Systems:  General/Constitutional:  Chills denies. Fatigue/weakness denies. Fever denies . Night sweats denies .  Gastrointestinal:  Abdominal pain denies. Blood in stool denies. Constipation denies. Diarrhea denies. Heartburn denies. Nausea denies. Vomiting denies  Genitourinary:  Incontinence denies. Blood in urine denies. Frequent urination denies. Urgency denies. Painful urination denies.  Gynecologic:  Irregular menses denies. Heavy bleeding denies. Painful menses denies. Vaginal discharge denies. Vaginal odor denies. Vaginal lesion denies. Pelvic pain denies. Decreased libido denies. Vulvar lesion denies. Prolapse of genital organs denies. Painful intercourse denies.      Physical Exam:   There were no vitals filed for this visit.  There is no height or weight on file to calculate BMI.    Chaperone present.    Constitutional: General appearance, healthy, well-nourished and well-developed.  Psychiatric: Orientation to time, place, and person.         Mood and Affect: normal mood and affect and active, alert.    Assessment:      Patient Active Problem List   Diagnosis    Sinusitis    School physical exam       Health Maintenance Due   Topic Date Due    Hepatitis C Screening  Never done    Pneumococcal Vaccines (Age 0-64) (1 of 2 - PCV) 10/18/2008    HPV Vaccines (1 - 2-dose series) Never done    HIV Screening  Never done    Chlamydia Screening  08/19/2023    Pap Smear  Never done     Health Maintenance Topics with due status: Not Due       Topic Last Completion Date    TETANUS VACCINE 12/15/2023           Plan:    There are no diagnoses linked to this encounter.

## 2024-04-01 ENCOUNTER — CLINICAL SUPPORT (OUTPATIENT)
Dept: OBSTETRICS AND GYNECOLOGY | Facility: CLINIC | Age: 24
End: 2024-04-01
Payer: COMMERCIAL

## 2024-04-01 VITALS
WEIGHT: 216 LBS | SYSTOLIC BLOOD PRESSURE: 116 MMHG | HEIGHT: 60 IN | TEMPERATURE: 96 F | DIASTOLIC BLOOD PRESSURE: 70 MMHG | BODY MASS INDEX: 42.41 KG/M2

## 2024-04-01 DIAGNOSIS — Z30.9 ENCOUNTER FOR CONTRACEPTIVE MANAGEMENT, UNSPECIFIED TYPE: Primary | ICD-10-CM

## 2024-04-01 PROCEDURE — 96372 THER/PROPH/DIAG INJ SC/IM: CPT | Mod: ,,, | Performed by: NURSE PRACTITIONER

## 2024-04-01 RX ORDER — MEDROXYPROGESTERONE ACETATE 150 MG/ML
150 INJECTION, SUSPENSION INTRAMUSCULAR
Status: COMPLETED | OUTPATIENT
Start: 2024-04-01 | End: 2024-04-01

## 2024-04-01 RX ADMIN — MEDROXYPROGESTERONE ACETATE 150 MG: 150 INJECTION, SUSPENSION INTRAMUSCULAR at 01:04

## 2024-04-01 NOTE — PROGRESS NOTES
Chief Complaint:     Chief Complaint   Patient presents with    Contraception     Depo 2024         HPI:   23 y.o.  presents last depo 2024. Amenorrhea with Depo. No complaints.    LMP: silent menses w/ DMPA  Frequency: n/a  Cycle Length: n/a  Flow: light  Intermenstrual Bleeding: No  Postcoital Bleeding: No  Dysmenorrhea: No  Sexually Active: Yes    Dyspareunia: No  Contraception: DMPA last injection 2024  H/o STI: HSV  Last pap: no history  H/o Abnormal Pap: No   [place]        Current Outpatient Medications:     medroxyPROGESTERone (DEPO-PROVERA) 150 mg/mL injection, Inject 150 mg into the muscle every 3 (three) months., Disp: , Rfl:   No current facility-administered medications for this visit.    Review of patient's allergies indicates:  No Known Allergies    Social History     Tobacco Use    Smoking status: Former     Types: Vaping with nicotine     Start date:      Quit date: 2024     Years since quittin.2     Passive exposure: Current    Smokeless tobacco: Never   Substance Use Topics    Alcohol use: Yes     Comment: ocassional    Drug use: Never       Review of Systems:   General/Constitutional: Chills denies. Fatigue/weakness denies. Fever denies. Night sweats denies. Hot flashes denies    Respiratory: Cough denies. Hemoptysis denies. SOB denies. Sputum production denies. Wheezing denies .   Cardiovascular: Chest pain denies . Dizziness denies. Palpitations denies. Swelling in hands/feet denies    Gastrointestinal: Abdominal pain denies. Blood in stool denies. Constipation denies. Diarrhea denies. Heartburn denies. Nausea denies. Vomiting denies    Genitourinary: Incontinence denies. Blood in urine denies. Frequent urination denies. Painful urination denies. Urinary urgency denies. Nocturia denies    Gynecologic: Irregular menses denies. Heavy bleeding denies. Painful menses denies. Vaginal discharge denies. Vaginal odor denies. Vaginal itching denies. Vaginal lesion  denies. Pelvic pain denies. Decreased libido denies. Vulvar lesion denies. Prolapse of genital organs denies. Painful intercourse denies. Postcoital bleeding denies    Psychiatric: Depression denies. Anxiety denies       Physical Exam:   Vitals:    04/01/24 1323   BP: 116/70   BP Location: Left arm   Patient Position: Sitting   BP Method: Medium (Manual)   Temp: 96.4 °F (35.8 °C)   TempSrc: Temporal   Weight: 98 kg (216 lb)   Height: 5' (1.524 m)       Body mass index is 42.18 kg/m².    Constitutional:       Appearance: She is well-developed  Abdominal:       General: Abdomen is flat.  Neurological:        Mental Status: She is alert and oriented to person, place and time.   Psychiatric:       Attention and Perception: Attention normal.       Mood and Affect: Mood normal. Mood is not anxious or depressed.       Assessment:     Patient Active Problem List   Diagnosis    Sinusitis    School physical exam       Health Maintenance Due   Topic Date Due    Hepatitis C Screening  Never done    Pneumococcal Vaccines (Age 0-64) (1 of 2 - PCV) 10/18/2008    HPV Vaccines (1 - 2-dose series) Never done    HIV Screening  Never done    Chlamydia Screening  08/19/2023    Pap Smear  Never done     Health Maintenance Topics with due status: Not Due       Topic Last Completion Date    TETANUS VACCINE 12/15/2023           Plan:    Zora was seen today for contraception.    Diagnoses and all orders for this visit:    Encounter for contraceptive management, unspecified type  -     medroxyPROGESTERone (DEPO-PROVERA) injection 150 mg    Administered in right arm. No adverse reaction. Return to clinic in 12 weeks. No complaints noted. Pt tolerated well.

## 2024-06-18 ENCOUNTER — OFFICE VISIT (OUTPATIENT)
Dept: OBSTETRICS AND GYNECOLOGY | Facility: CLINIC | Age: 24
End: 2024-06-18
Payer: COMMERCIAL

## 2024-06-18 VITALS
TEMPERATURE: 97 F | WEIGHT: 219 LBS | HEIGHT: 60 IN | BODY MASS INDEX: 43 KG/M2 | DIASTOLIC BLOOD PRESSURE: 68 MMHG | SYSTOLIC BLOOD PRESSURE: 124 MMHG

## 2024-06-18 DIAGNOSIS — Z30.9 ENCOUNTER FOR CONTRACEPTIVE MANAGEMENT, UNSPECIFIED TYPE: ICD-10-CM

## 2024-06-18 DIAGNOSIS — Z30.8 ENCOUNTER FOR OTHER CONTRACEPTIVE MANAGEMENT: ICD-10-CM

## 2024-06-18 DIAGNOSIS — Z01.411 ABNORMAL GYNECOLOGICAL EXAMINATION: Primary | ICD-10-CM

## 2024-06-18 DIAGNOSIS — N91.5 OLIGOMENORRHEA, UNSPECIFIED TYPE: ICD-10-CM

## 2024-06-18 LAB
B-HCG UR QL: NEGATIVE
CTP QC/QA: YES

## 2024-06-18 PROCEDURE — 81025 URINE PREGNANCY TEST: CPT | Mod: ,,, | Performed by: NURSE PRACTITIONER

## 2024-06-18 PROCEDURE — 87591 N.GONORRHOEAE DNA AMP PROB: CPT | Performed by: NURSE PRACTITIONER

## 2024-06-18 PROCEDURE — 87491 CHLMYD TRACH DNA AMP PROBE: CPT | Performed by: NURSE PRACTITIONER

## 2024-06-18 PROCEDURE — 1160F RVW MEDS BY RX/DR IN RCRD: CPT | Mod: CPTII,,, | Performed by: NURSE PRACTITIONER

## 2024-06-18 PROCEDURE — 3008F BODY MASS INDEX DOCD: CPT | Mod: CPTII,,, | Performed by: NURSE PRACTITIONER

## 2024-06-18 PROCEDURE — 87661 TRICHOMONAS VAGINALIS AMPLIF: CPT | Performed by: NURSE PRACTITIONER

## 2024-06-18 PROCEDURE — 3078F DIAST BP <80 MM HG: CPT | Mod: CPTII,,, | Performed by: NURSE PRACTITIONER

## 2024-06-18 PROCEDURE — 1159F MED LIST DOCD IN RCRD: CPT | Mod: CPTII,,, | Performed by: NURSE PRACTITIONER

## 2024-06-18 PROCEDURE — 3074F SYST BP LT 130 MM HG: CPT | Mod: CPTII,,, | Performed by: NURSE PRACTITIONER

## 2024-06-18 PROCEDURE — 99395 PREV VISIT EST AGE 18-39: CPT | Mod: ,,, | Performed by: NURSE PRACTITIONER

## 2024-06-18 RX ORDER — NORELGESTROMIN AND ETHINYL ESTRADIOL 35; 150 UG/MG; UG/MG
1 PATCH TRANSDERMAL WEEKLY
Qty: 3 PATCH | Refills: 3 | Status: SHIPPED | OUTPATIENT
Start: 2024-06-18

## 2024-06-21 LAB
CHLAMYDIA TRACHOMATIS: NEGATIVE
NEISSERIA GONORRHOEAE: NEGATIVE
PSYCHE PATHOLOGY RESULT: NORMAL
TRICHOMONAS VAGINALIS: NEGATIVE

## 2024-06-24 ENCOUNTER — TELEPHONE (OUTPATIENT)
Dept: OBSTETRICS AND GYNECOLOGY | Facility: CLINIC | Age: 24
End: 2024-06-24
Payer: COMMERCIAL

## 2024-06-24 DIAGNOSIS — Z30.9 ENCOUNTER FOR CONTRACEPTIVE MANAGEMENT, UNSPECIFIED TYPE: Primary | ICD-10-CM

## 2024-06-24 RX ORDER — NORETHINDRONE ACETATE AND ETHINYL ESTRADIOL AND FERROUS FUMARATE 1MG-20(24)
1 KIT ORAL DAILY
Qty: 28 TABLET | Refills: 3 | Status: SHIPPED | OUTPATIENT
Start: 2024-06-24 | End: 2024-06-25

## 2024-06-24 NOTE — TELEPHONE ENCOUNTER
Contacted pt per WERO Pool regarding call left by pt. Pt wanted to change her birth control. WERO Pool stated to send Minastrin to pharmacy and pt will need to come in for 3 mon th F/u,  if pt still has patch on to start Minastrin as soon as she receives it and can discontinue the patch. Pt stated she did still have it on. Medication sent to [facility].Pt verbalized understanding.

## 2024-06-24 NOTE — TELEPHONE ENCOUNTER
----- Message from Cata Truong sent at 6/24/2024  2:03 PM CDT -----  Regarding: Med switch  .Type:  Needs Medical Advice    Who Called:  patient  Best Call Back Number:  [phone]  Additional Information:  called requesting being sent out birth control pills instead to [facility] in [place]- states she was just here on 6/18 to switch to the patches but she absolutely hates them and wants to try the pills, informed pt I'll send a message but being that she wants to switch again- she may need to come in

## 2024-06-25 ENCOUNTER — TELEPHONE (OUTPATIENT)
Dept: OBSTETRICS AND GYNECOLOGY | Facility: CLINIC | Age: 24
End: 2024-06-25
Payer: COMMERCIAL

## 2024-06-25 DIAGNOSIS — Z30.9 ENCOUNTER FOR CONTRACEPTIVE MANAGEMENT, UNSPECIFIED TYPE: Primary | ICD-10-CM

## 2024-06-25 RX ORDER — DESOGESTREL AND ETHINYL ESTRADIOL 21-5 (28)
1 KIT ORAL DAILY
Qty: 28 TABLET | Refills: 3 | Status: SHIPPED | OUTPATIENT
Start: 2024-06-25 | End: 2025-06-25

## 2024-06-25 NOTE — TELEPHONE ENCOUNTER
Attempted to contact pt, per WERO Pool birth control was switched from Minastrin to Kariva. Medication will be ready tomorrow for pt to  from pharmacy. Left message for pt to call office when she receives the message.

## 2024-06-25 NOTE — PROGRESS NOTES
Pharmacy unable to obtain Minastrin birth control pills we will change prescription to Mircette, prescription sent to [facility] after speaking with pharmacist.  Patient will follow up in 3 months for OCP

## 2024-10-14 ENCOUNTER — TELEPHONE (OUTPATIENT)
Dept: OBSTETRICS AND GYNECOLOGY | Facility: CLINIC | Age: 24
End: 2024-10-14
Payer: COMMERCIAL

## 2024-10-14 DIAGNOSIS — Z30.9 ENCOUNTER FOR CONTRACEPTIVE MANAGEMENT, UNSPECIFIED TYPE: ICD-10-CM

## 2024-10-14 RX ORDER — DESOGESTREL AND ETHINYL ESTRADIOL 21-5 (28)
1 KIT ORAL
Qty: 28 TABLET | Refills: 3 | OUTPATIENT
Start: 2024-10-14

## 2024-10-14 NOTE — TELEPHONE ENCOUNTER
Called the patient and let her know that she needs an appt to f/u on OCP.     She stated that she just received a refill on 10/13/24 so she is good on pills.     I let her know that I will transfer her to the front staff to make an appt for her f/u/     She verbalized a clear understanding.     Phone was disconnected. Tried calling back no answer.

## 2025-01-14 ENCOUNTER — OFFICE VISIT (OUTPATIENT)
Dept: FAMILY MEDICINE | Facility: CLINIC | Age: 25
End: 2025-01-14
Payer: COMMERCIAL

## 2025-01-14 VITALS
RESPIRATION RATE: 18 BRPM | TEMPERATURE: 98 F | HEART RATE: 104 BPM | OXYGEN SATURATION: 99 % | WEIGHT: 226 LBS | BODY MASS INDEX: 44.37 KG/M2 | DIASTOLIC BLOOD PRESSURE: 74 MMHG | SYSTOLIC BLOOD PRESSURE: 116 MMHG | HEIGHT: 60 IN

## 2025-01-14 DIAGNOSIS — J32.9 SINUSITIS, UNSPECIFIED CHRONICITY, UNSPECIFIED LOCATION: Primary | ICD-10-CM

## 2025-01-14 LAB
CTP QC/QA: YES
CTP QC/QA: YES
FLUAV AG NPH QL: NEGATIVE
FLUBV AG NPH QL: NEGATIVE
SARS-COV-2 AG RESP QL IA.RAPID: NEGATIVE

## 2025-01-14 PROCEDURE — 87811 SARS-COV-2 COVID19 W/OPTIC: CPT | Mod: QW,,, | Performed by: NURSE PRACTITIONER

## 2025-01-14 PROCEDURE — 3078F DIAST BP <80 MM HG: CPT | Mod: CPTII,,, | Performed by: NURSE PRACTITIONER

## 2025-01-14 PROCEDURE — 3074F SYST BP LT 130 MM HG: CPT | Mod: CPTII,,, | Performed by: NURSE PRACTITIONER

## 2025-01-14 PROCEDURE — 1160F RVW MEDS BY RX/DR IN RCRD: CPT | Mod: CPTII,,, | Performed by: NURSE PRACTITIONER

## 2025-01-14 PROCEDURE — 87400 INFLUENZA A/B EACH AG IA: CPT | Mod: QW,,, | Performed by: NURSE PRACTITIONER

## 2025-01-14 PROCEDURE — 99214 OFFICE O/P EST MOD 30 MIN: CPT | Mod: ,,, | Performed by: NURSE PRACTITIONER

## 2025-01-14 PROCEDURE — 3008F BODY MASS INDEX DOCD: CPT | Mod: CPTII,,, | Performed by: NURSE PRACTITIONER

## 2025-01-14 PROCEDURE — 1159F MED LIST DOCD IN RCRD: CPT | Mod: CPTII,,, | Performed by: NURSE PRACTITIONER

## 2025-01-14 RX ORDER — CEFDINIR 300 MG/1
300 CAPSULE ORAL 2 TIMES DAILY
Qty: 20 CAPSULE | Refills: 0 | Status: SHIPPED | OUTPATIENT
Start: 2025-01-14 | End: 2025-01-24

## 2025-01-14 RX ORDER — AMOXICILLIN 875 MG/1
875 TABLET, FILM COATED ORAL 2 TIMES DAILY
COMMUNITY
Start: 2025-01-12 | End: 2025-01-14

## 2025-01-14 RX ORDER — METHYLPREDNISOLONE 4 MG/1
TABLET ORAL
Qty: 21 EACH | Refills: 0 | Status: SHIPPED | OUTPATIENT
Start: 2025-01-14 | End: 2025-02-04

## 2025-01-14 NOTE — PROGRESS NOTES
SUBJECTIVE:  Zora Gallardo is a 24 y.o. female here alone for cough, nasal congestion, body aches.    Cough  This is a new problem. The current episode started in the past 7 days. The problem has been gradually worsening. The problem occurs every few minutes. The cough is Non-productive. Associated symptoms include chills, ear congestion, a fever, headaches, nasal congestion, rhinorrhea, a sore throat, shortness of breath and wheezing. Pertinent negatives include no chest pain, ear pain, heartburn, hemoptysis, myalgias, postnasal drip, rash, sweats or weight loss. Nothing aggravates the symptoms. She has tried OTC cough suppressant, body position changes, prescription cough suppressant and rest for the symptoms. The treatment provided no relief. There is no history of asthma, bronchiectasis, bronchitis, COPD, emphysema, environmental allergies or pneumonia.     Patient presents with cough, nasal congestion, and body aches. Symptoms started Thursday. Went to Urgent Care in [employer] on Sunday, tested negative for strep and flu/covid. Was given Amoxicillin and steroid injection. States not feeling better and that Amoxicillin doesn't work well on her. Denies GI issues. States temp of 100 last night and 99.9 this morning. States took Tylenol about an hour ago. States throat only irritated when coughing. States does have shortness of breath.    davids allergies, medications, history, and problem list were updated as appropriate.    Review of Systems   Constitutional:  Positive for chills and fever. Negative for weight loss.   HENT:  Positive for rhinorrhea and sore throat. Negative for ear pain and postnasal drip.    Respiratory:  Positive for cough, shortness of breath and wheezing. Negative for hemoptysis.    Cardiovascular:  Negative for chest pain.   Gastrointestinal:  Negative for heartburn.   Musculoskeletal:  Negative for myalgias.   Skin:  Negative for rash.   Allergic/Immunologic: Negative for  environmental allergies.   Neurological:  Positive for headaches.      A comprehensive review of symptoms was completed and negative except as noted above.    OBJECTIVE:  Vital signs  Vitals:    01/14/25 1028   BP: 116/74   BP Location: Right arm   Patient Position: Sitting   Pulse: 104   Resp: 18   Temp: 97.5 °F (36.4 °C)   TempSrc: Temporal   SpO2: 99%   Weight: 102.5 kg (226 lb)   Height: 5' (1.524 m)        Physical Exam  Vitals and nursing note reviewed.   Constitutional:       Appearance: Normal appearance. She is ill-appearing.   HENT:      Head: Normocephalic and atraumatic.      Right Ear: Ear canal and external ear normal. A middle ear effusion is present.      Left Ear: Ear canal and external ear normal. A middle ear effusion is present.      Nose:      Right Sinus: Frontal sinus tenderness present.      Left Sinus: Frontal sinus tenderness present.      Mouth/Throat:      Mouth: Mucous membranes are moist.      Pharynx: Oropharynx is clear. Posterior oropharyngeal erythema present.   Eyes:      Extraocular Movements: Extraocular movements intact.      Conjunctiva/sclera: Conjunctivae normal.      Pupils: Pupils are equal, round, and reactive to light.   Cardiovascular:      Rate and Rhythm: Normal rate and regular rhythm.      Pulses: Normal pulses.      Heart sounds: Normal heart sounds.   Pulmonary:      Effort: Pulmonary effort is normal.      Breath sounds: Normal breath sounds.   Abdominal:      General: Abdomen is flat. Bowel sounds are normal.      Palpations: Abdomen is soft.   Musculoskeletal:         General: Normal range of motion.      Cervical back: Normal range of motion.   Skin:     General: Skin is warm and dry.      Capillary Refill: Capillary refill takes less than 2 seconds.   Neurological:      General: No focal deficit present.      Mental Status: She is alert.   Psychiatric:         Mood and Affect: Mood normal.         Behavior: Behavior normal.         Thought Content: Thought  content normal.         Judgment: Judgment normal.          Office Visit on 01/14/2025   Component Date Value Ref Range Status    Rapid Influenza A Ag 01/14/2025 Negative  Negative Final    Rapid Influenza B Ag 01/14/2025 Negative  Negative Final     Acceptable 01/14/2025 Yes   Final    SARS Coronavirus 2 Antigen 01/14/2025 Negative  Negative Final     Acceptable 01/14/2025 Yes   Final          ASSESSMENT/PLAN:    1. Sinusitis, unspecified chronicity, unspecified location  -     methylPREDNISolone (MEDROL DOSEPACK) 4 mg tablet; use as directed  Dispense: 21 each; Refill: 0  -     cefdinir (OMNICEF) 300 MG capsule; Take 1 capsule (300 mg total) by mouth 2 (two) times daily. for 10 days  Dispense: 20 capsule; Refill: 0  -     POCT Influenza A/B  -     SARS Coronavirus 2 Antigen, POCT Manual Read          Recent Results (from the past 24 hours)   POCT Influenza A/B    Collection Time: 01/14/25 10:42 AM   Result Value Ref Range    Rapid Influenza A Ag Negative Negative    Rapid Influenza B Ag Negative Negative     Acceptable Yes    SARS Coronavirus 2 Antigen, POCT Manual Read    Collection Time: 01/14/25 10:43 AM   Result Value Ref Range    SARS Coronavirus 2 Antigen Negative Negative     Acceptable Yes        Follow Up:  Follow up if symptoms worsen or fail to improve.    If a referral was sent and you are not notified and scheduled with specialist within 2 weeks, please notify office to ensure specialty appointment is scheduled in a timely manner.   Discussed the diagnosis, prognosis, plan of care, chronic nature of and indications for, risks and benefits of treatment for conditions.  Continue all medications as prescribed unless otherwise noted.   Call if patient develops other symptoms or if not better in 2-3 days and sooner if worse. Emphasized the importance of compliance with all recommendations, including medication use and timely follow up. Instructed  to return as noted be or sooner if patient develops any other problems or if there are any other additional questions or concerns.

## 2025-01-14 NOTE — LETTER
January 14, 2025      [facility]  [address] [place] [id number]  Phone: [phone]  Fax: [phone]       Patient: Zora Gallardo   YOB: 2000  Date of Visit: 01/14/2025    To Whom It May Concern:    lito Gallardo  was at [facility] on 01/14/2025. The patient may return to work on 01/15/2025 with no restrictions. If you have any questions or concerns, or if I can be of further assistance, please do not hesitate to contact me.    Sincerely,    Mae Camp LPN

## 2025-02-13 ENCOUNTER — OFFICE VISIT (OUTPATIENT)
Dept: FAMILY MEDICINE | Facility: CLINIC | Age: 25
End: 2025-02-13
Payer: COMMERCIAL

## 2025-02-13 VITALS
BODY MASS INDEX: 43.78 KG/M2 | DIASTOLIC BLOOD PRESSURE: 79 MMHG | TEMPERATURE: 98 F | WEIGHT: 223 LBS | HEIGHT: 60 IN | OXYGEN SATURATION: 99 % | HEART RATE: 109 BPM | SYSTOLIC BLOOD PRESSURE: 121 MMHG | RESPIRATION RATE: 20 BRPM

## 2025-02-13 DIAGNOSIS — J02.9 PHARYNGITIS, UNSPECIFIED ETIOLOGY: ICD-10-CM

## 2025-02-13 DIAGNOSIS — Z00.00 WELLNESS EXAMINATION: Primary | ICD-10-CM

## 2025-02-13 DIAGNOSIS — E55.9 VITAMIN D DEFICIENCY: ICD-10-CM

## 2025-02-13 DIAGNOSIS — R73.01 IMPAIRED FASTING GLUCOSE: ICD-10-CM

## 2025-02-13 DIAGNOSIS — Z91.09 ENVIRONMENTAL ALLERGIES: ICD-10-CM

## 2025-02-13 DIAGNOSIS — J30.9 CHRONIC ALLERGIC RHINITIS: ICD-10-CM

## 2025-02-13 LAB
CTP QC/QA: YES
MOLECULAR STREP A: NEGATIVE

## 2025-02-13 RX ORDER — LEVOCETIRIZINE DIHYDROCHLORIDE 5 MG/1
5 TABLET, FILM COATED ORAL NIGHTLY
Qty: 30 TABLET | Refills: 11 | Status: SHIPPED | OUTPATIENT
Start: 2025-02-13 | End: 2026-02-13

## 2025-02-13 RX ORDER — DEXAMETHASONE SODIUM PHOSPHATE 10 MG/ML
10 INJECTION INTRAMUSCULAR; INTRAVENOUS
Status: COMPLETED | OUTPATIENT
Start: 2025-02-13 | End: 2025-02-13

## 2025-02-13 RX ADMIN — DEXAMETHASONE SODIUM PHOSPHATE 10 MG: 10 INJECTION INTRAMUSCULAR; INTRAVENOUS at 09:02

## 2025-02-13 NOTE — PROGRESS NOTES
SUBJECTIVE:  Zora Gallardo is a 24 y.o. female here alone for wellness (With fasting labs.) and Sore Throat (Over a month ago)      History of Present Illness    CHIEF COMPLAINT:  Patient presents today for persistent allergy symptoms and fatigue.    HISTORY OF PRESENT ILLNESS:  She experienced a fever of 103°F at work on Thursday. After completing medications, she had a runny nose for about a week. Although there was approximately one week between illnesses where symptoms improved, she continued to experience constant fatigue. She currently reports persistent fatigue and tiredness. She continues with recurrent severe allergies, recurrent sore throats, nasal drainage, and sinus pressure. She is wanting to follow with allergist. Father does see someone in [place] and does want to seek treatment with that provider.     GYNECOLOGIC:  She reports regular monthly menstrual cycles.      ROS:  General: +fever, -chills, +fatigue, -weight gain, -weight loss  Eyes: -vision changes, -redness, -discharge  ENT: -ear pain, -nasal congestion, -sore throat  Cardiovascular: -chest pain, -palpitations, -lower extremity edema  Respiratory: -cough, -shortness of breath  Gastrointestinal: -abdominal pain, -nausea, -vomiting, -diarrhea, -constipation, -blood in stool  Genitourinary: -dysuria, -hematuria, -frequency  Musculoskeletal: -joint pain, -muscle pain  Skin: -rash, -lesion  Neurological: -headache, -dizziness, -numbness, -tingling  Psychiatric: -anxiety, -depression, -sleep difficulty          Edys allergies, medications, history, and problem list were updated as appropriate.      A comprehensive review of symptoms was completed and negative except as noted above.    OBJECTIVE:  Vital signs  Vitals:    02/13/25 0901   BP: 121/79   BP Location: Left arm   Patient Position: Sitting   Pulse: 109   Resp: 20   Temp: 97.6 °F (36.4 °C)   SpO2: 99%   Weight: 101.2 kg (223 lb)   Height: 5' (1.524 m)        Physical Exam  Vitals  and nursing note reviewed.   Constitutional:       Appearance: Normal appearance.   HENT:      Head: Normocephalic and atraumatic.      Right Ear: Tympanic membrane, ear canal and external ear normal.      Left Ear: Tympanic membrane, ear canal and external ear normal.      Nose: Nose normal.      Mouth/Throat:      Mouth: Mucous membranes are moist.      Pharynx: Oropharynx is clear.   Eyes:      Extraocular Movements: Extraocular movements intact.      Conjunctiva/sclera: Conjunctivae normal.      Pupils: Pupils are equal, round, and reactive to light.   Cardiovascular:      Rate and Rhythm: Normal rate and regular rhythm.      Pulses: Normal pulses.      Heart sounds: Normal heart sounds.   Pulmonary:      Effort: Pulmonary effort is normal.      Breath sounds: Normal breath sounds.   Abdominal:      General: Abdomen is flat. Bowel sounds are normal.      Palpations: Abdomen is soft.   Musculoskeletal:         General: Normal range of motion.      Cervical back: Normal range of motion.   Skin:     General: Skin is warm and dry.      Capillary Refill: Capillary refill takes less than 2 seconds.   Neurological:      General: No focal deficit present.      Mental Status: She is alert.   Psychiatric:         Mood and Affect: Mood normal.         Behavior: Behavior normal.         Thought Content: Thought content normal.         Judgment: Judgment normal.          Office Visit on 02/13/2025   Component Date Value Ref Range Status    Molecular Strep A, POC 02/13/2025 Negative  Negative Final     Acceptable 02/13/2025 Yes   Final          ASSESSMENT/PLAN:  Assessment & Plan    Z00.00 Wellness exam  J02.9 Pharyngitis, unspecified etiology    IMPRESSION:  - Considering possible flu or strep infection based on patient's reported symptoms and fever  - Evaluating for potential viral infection    Z00.00 WELLNESS EXAM:  - Ordered labs including complete blood count, liver function tests, A1C, vitamin D level,  and thyroid function tests.  - Scheduled follow-up visit after test results are available for further evaluation and treatment planning.    J02.9 PHARYNGITIS, UNSPECIFIED ETIOLOGY:  - Ordered strep test to determine the etiology of pharyngitis.        1. Wellness examination  -     CBC Auto Differential  -     Comprehensive Metabolic Panel  -     Lipid Panel  -     TSH    2. Pharyngitis, unspecified etiology  -     POCT Strep A, Molecular    3. Chronic allergic rhinitis  -     levocetirizine (XYZAL) 5 MG tablet; Take 1 tablet (5 mg total) by mouth every evening.  Dispense: 30 tablet; Refill: 11  -     dexAMETHasone injection 10 mg    4. Vitamin D deficiency  -     Vitamin D    5. Impaired fasting glucose  -     Hemoglobin A1C         Follow Up:  Follow up in about 6 months (around 8/13/2025) for Clinic Follow Up.    If a referral was sent and you are not notified and scheduled with specialist within 2 weeks, please notify office to ensure specialty appointment is scheduled in a timely manner.   Discussed the diagnosis, prognosis, plan of care, chronic nature of and indications for, risks and benefits of treatment for conditions.  Continue all medications as prescribed unless otherwise noted.   Call if patient develops other symptoms or if not better in 2-3 days and sooner if worse. Emphasized the importance of compliance with all recommendations, including medication use and timely follow up. Instructed to return as noted be or sooner if patient develops any other problems or if there are any other additional questions or concerns.      This note was generated with the assistance of ambient listening technology. Verbal consent was obtained by the patient and accompanying visitor(s) for the recording of patient appointment to facilitate this note. I attest to having reviewed and edited the generated note for accuracy, though some syntax or spelling errors may persist. Please contact the author of this note for any  clarification.           Answers submitted by the patient for this visit:  Review of Systems Questionnaire (Submitted on 2/12/2025)  activity change: No  unexpected weight change: No  neck pain: No  hearing loss: No  rhinorrhea: No  trouble swallowing: No  eye discharge: No  visual disturbance: No  chest tightness: No  wheezing: No  chest pain: No  palpitations: No  blood in stool: No  constipation: No  vomiting: No  diarrhea: No  polydipsia: No  polyuria: No  difficulty urinating: No  hematuria: No  menstrual problem: No  dysuria: No  joint swelling: No  arthralgias: No  headaches: Yes  weakness: No  confusion: No  dysphoric mood: No

## 2025-02-15 LAB
25(OH)D3+25(OH)D2 SERPL-MCNC: 19.5 NG/ML (ref 30–100)
ALBUMIN SERPL-MCNC: 4.6 G/DL (ref 4–5)
ALP SERPL-CCNC: 123 IU/L (ref 44–121)
ALT SERPL-CCNC: 19 IU/L (ref 0–32)
AST SERPL-CCNC: 17 IU/L (ref 0–40)
BASOPHILS # BLD AUTO: 0.2 X10E3/UL (ref 0–0.2)
BASOPHILS NFR BLD AUTO: 2 %
BILIRUB SERPL-MCNC: <0.2 MG/DL (ref 0–1.2)
BUN SERPL-MCNC: 13 MG/DL (ref 6–20)
BUN/CREAT SERPL: 20 (ref 9–23)
CALCIUM SERPL-MCNC: 9.7 MG/DL (ref 8.7–10.2)
CHLORIDE SERPL-SCNC: 103 MMOL/L (ref 96–106)
CHOLEST SERPL-MCNC: 210 MG/DL (ref 100–199)
CO2 SERPL-SCNC: 20 MMOL/L (ref 20–29)
CREAT SERPL-MCNC: 0.65 MG/DL (ref 0.57–1)
EOSINOPHIL # BLD AUTO: 0.4 X10E3/UL (ref 0–0.4)
EOSINOPHIL NFR BLD AUTO: 3 %
ERYTHROCYTE [DISTWIDTH] IN BLOOD BY AUTOMATED COUNT: 12.8 % (ref 11.7–15.4)
EST. GFR  (NO RACE VARIABLE): 128 ML/MIN/1.73
GLOBULIN SER CALC-MCNC: 3.1 G/DL (ref 1.5–4.5)
GLUCOSE SERPL-MCNC: 118 MG/DL (ref 70–99)
HBA1C MFR BLD: 7.1 % (ref 4.8–5.6)
HCT VFR BLD AUTO: 42.5 % (ref 34–46.6)
HDLC SERPL-MCNC: 40 MG/DL
HGB BLD-MCNC: 14.3 G/DL (ref 11.1–15.9)
IMM GRANULOCYTES NFR BLD AUTO: 4 %
LDLC SERPL CALC-MCNC: 133 MG/DL (ref 0–99)
LYMPHOCYTES # BLD AUTO: 4 X10E3/UL (ref 0.7–3.1)
LYMPHOCYTES NFR BLD AUTO: 27 %
MCH RBC QN AUTO: 28.1 PG (ref 26.6–33)
MCHC RBC AUTO-ENTMCNC: 33.6 G/DL (ref 31.5–35.7)
MCV RBC AUTO: 84 FL (ref 79–97)
MONOCYTES # BLD AUTO: 1.2 X10E3/UL (ref 0.1–0.9)
MONOCYTES NFR BLD AUTO: 8 %
NEUTROPHILS # BLD AUTO: 8.8 X10E3/UL (ref 1.4–7)
NEUTROPHILS NFR BLD AUTO: 56 %
PLATELET # BLD AUTO: 306 X10E3/UL (ref 150–450)
POTASSIUM SERPL-SCNC: 3.9 MMOL/L (ref 3.5–5.2)
PROT SERPL-MCNC: 7.7 G/DL (ref 6–8.5)
RBC # BLD AUTO: 5.08 X10E6/UL (ref 3.77–5.28)
SODIUM SERPL-SCNC: 142 MMOL/L (ref 134–144)
TRIGL SERPL-MCNC: 209 MG/DL (ref 0–149)
TSH SERPL DL<=0.005 MIU/L-ACNC: 2.95 UIU/ML (ref 0.45–4.5)
VLDLC SERPL CALC-MCNC: 37 MG/DL (ref 5–40)
WBC # BLD AUTO: 15.2 X10E3/UL (ref 3.4–10.8)

## 2025-02-17 ENCOUNTER — RESULTS FOLLOW-UP (OUTPATIENT)
Dept: FAMILY MEDICINE | Facility: CLINIC | Age: 25
End: 2025-02-17

## 2025-02-20 ENCOUNTER — OFFICE VISIT (OUTPATIENT)
Dept: FAMILY MEDICINE | Facility: CLINIC | Age: 25
End: 2025-02-20
Payer: COMMERCIAL

## 2025-02-20 VITALS
OXYGEN SATURATION: 99 % | HEIGHT: 60 IN | BODY MASS INDEX: 43.74 KG/M2 | HEART RATE: 103 BPM | DIASTOLIC BLOOD PRESSURE: 86 MMHG | RESPIRATION RATE: 18 BRPM | WEIGHT: 222.81 LBS | TEMPERATURE: 97 F | SYSTOLIC BLOOD PRESSURE: 132 MMHG

## 2025-02-20 DIAGNOSIS — E55.9 VITAMIN D DEFICIENCY: ICD-10-CM

## 2025-02-20 DIAGNOSIS — R73.01 IMPAIRED FASTING GLUCOSE: ICD-10-CM

## 2025-02-20 DIAGNOSIS — E11.65 TYPE 2 DIABETES MELLITUS WITH HYPERGLYCEMIA, WITHOUT LONG-TERM CURRENT USE OF INSULIN: Primary | ICD-10-CM

## 2025-02-20 LAB — GLUCOSE SERPL-MCNC: 142 MG/DL (ref 70–110)

## 2025-02-20 RX ORDER — CHOLECALCIFEROL (VITAMIN D3) 25 MCG
1000 TABLET ORAL DAILY
Qty: 30 TABLET | Refills: 6 | Status: SHIPPED | OUTPATIENT
Start: 2025-02-20

## 2025-02-20 RX ORDER — MULTIVITAMIN
1 TABLET ORAL DAILY
COMMUNITY

## 2025-02-20 RX ORDER — TIRZEPATIDE 2.5 MG/.5ML
2.5 INJECTION, SOLUTION SUBCUTANEOUS
Qty: 2 ML | Refills: 3 | Status: SHIPPED | OUTPATIENT
Start: 2025-02-20

## 2025-02-20 NOTE — PROGRESS NOTES
SUBJECTIVE:  Zora Gallardo is a 24 y.o. female here alone for review labs      History of Present Illness    CHIEF COMPLAINT:  Patient presents today for follow up of pre-diabetes    PRE-DIABETES:  She has a history of pre-diabetes with previous blood sugar readings in the 120s-130s. She reports symptoms including increased hunger, sweating, hot flashes, and excessive sleepiness, but denies increased urination. She has a glucometer but does not regularly check blood sugars at home. She reports previous intolerance to metformin due to GI upset.    RECENT ILLNESS:  She reports improving cough with overall improvement in symptoms.    DIET:  She primarily drinks homemade lemonade and water, with rare soda consumption.      ROS:  General: -fever, -chills, -fatigue, -weight gain, -weight loss  Eyes: -vision changes, -redness, -discharge  ENT: -ear pain, -nasal congestion, -sore throat  Cardiovascular: -chest pain, -palpitations, -lower extremity edema  Respiratory: +cough, -shortness of breath  Gastrointestinal: -abdominal pain, -nausea, -vomiting, -diarrhea, -constipation, -blood in stool  Genitourinary: -dysuria, -hematuria, -frequency  Musculoskeletal: -joint pain, -muscle pain  Skin: -rash, -lesion  Neurological: -headache, -dizziness, -numbness, -tingling  Psychiatric: -anxiety, -depression, -sleep difficulty  Endocrine: +excessive sweating          Edys allergies, medications, history, and problem list were updated as appropriate.      A comprehensive review of symptoms was completed and negative except as noted above.    OBJECTIVE:  Vital signs  Vitals:    02/20/25 1445   BP: 132/86   BP Location: Right arm   Patient Position: Sitting   Pulse: 103   Resp: 18   Temp: 96.8 °F (36 °C)   TempSrc: Temporal   SpO2: 99%   Weight: 101.1 kg (222 lb 12.8 oz)   Height: 5' (1.524 m)        Physical Exam  Vitals and nursing note reviewed.   Constitutional:       Appearance: Normal appearance.   HENT:      Head:  Normocephalic and atraumatic.      Right Ear: Tympanic membrane, ear canal and external ear normal.      Left Ear: Tympanic membrane, ear canal and external ear normal.      Nose: Nose normal.      Mouth/Throat:      Mouth: Mucous membranes are moist.      Pharynx: Oropharynx is clear.   Eyes:      Extraocular Movements: Extraocular movements intact.      Conjunctiva/sclera: Conjunctivae normal.      Pupils: Pupils are equal, round, and reactive to light.   Cardiovascular:      Rate and Rhythm: Normal rate and regular rhythm.      Pulses: Normal pulses.      Heart sounds: Normal heart sounds.   Pulmonary:      Effort: Pulmonary effort is normal.      Breath sounds: Normal breath sounds.   Abdominal:      General: Abdomen is flat. Bowel sounds are normal.      Palpations: Abdomen is soft.   Musculoskeletal:         General: Normal range of motion.      Cervical back: Normal range of motion.   Skin:     General: Skin is warm and dry.      Capillary Refill: Capillary refill takes less than 2 seconds.   Neurological:      General: No focal deficit present.      Mental Status: She is alert.   Psychiatric:         Mood and Affect: Mood normal.         Behavior: Behavior normal.         Thought Content: Thought content normal.         Judgment: Judgment normal.          Office Visit on 02/20/2025   Component Date Value Ref Range Status    POC Glucose 02/20/2025 142 (A)  70 - 110 MG/DL Final          ASSESSMENT/PLAN:  Assessment & Plan    R73.01 Impaired fasting glucose    IMPRESSION:  - Assessed patient's elevated A1c (7.1%) indicating diabetes range, considering previous pre-diabetes diagnosis  - Evaluated recent CBC results, noting white blood cell elevation likely due to recent illness  - Reviewed CMP results, finding liver enzymes and other values within acceptable range  - Considered LDL and triglyceride levels elevated, but opted against statin therapy due to patient's age and childbearing potential  - Identified low  vitamin D levels requiring supplementation  - Determined patient's thyroid function is normal  - Will initiate GLP-1 receptor agonist therapy, preferring Mounjaro over Ozempic   -metformin intolerant due to GI upset  -check blood sugar two times daily and return in 1 month with blood sugar log  -educated GLP-1 administration, safety, side effects. Discussed diabetic diet and normal blood sugar ranges       1. Type 2 diabetes mellitus with hyperglycemia, without long-term current use of insulin  -     tirzepatide (MOUNJARO) 2.5 mg/0.5 mL PnIj; Inject 2.5 mg into the skin every 7 days.  Dispense: 2 mL; Refill: 3    2. Impaired fasting glucose  -     POCT Glucose, Hand-Held Device    3. Vitamin D deficiency  -     vitamin D (VITAMIN D3) 1000 units Tab; Take 1 tablet (1,000 Units total) by mouth once daily.  Dispense: 30 tablet; Refill: 6         Follow Up:  Follow up in about 4 weeks (around 3/20/2025) for cbc, cmp, a1c, vitamin d, flp in 6 months. follow up 1 month blood sugar log .    If a referral was sent and you are not notified and scheduled with specialist within 2 weeks, please notify office to ensure specialty appointment is scheduled in a timely manner.   Discussed the diagnosis, prognosis, plan of care, chronic nature of and indications for, risks and benefits of treatment for conditions.  Continue all medications as prescribed unless otherwise noted.   Call if patient develops other symptoms or if not better in 2-3 days and sooner if worse. Emphasized the importance of compliance with all recommendations, including medication use and timely follow up. Instructed to return as noted be or sooner if patient develops any other problems or if there are any other additional questions or concerns.      This note was generated with the assistance of ambient listening technology. Verbal consent was obtained by the patient and accompanying visitor(s) for the recording of patient appointment to facilitate this note. I  attest to having reviewed and edited the generated note for accuracy, though some syntax or spelling errors may persist. Please contact the author of this note for any clarification.         Answers submitted by the patient for this visit:  Review of Systems Questionnaire (Submitted on 2/20/2025)  activity change: No  unexpected weight change: No  neck pain: No  hearing loss: No  rhinorrhea: No  trouble swallowing: No  eye discharge: No  visual disturbance: No  chest tightness: No  wheezing: No  chest pain: No  palpitations: No  blood in stool: No  constipation: No  vomiting: No  diarrhea: No  polydipsia: No  polyuria: No  difficulty urinating: No  hematuria: No  menstrual problem: No  dysuria: No  joint swelling: No  arthralgias: No  headaches: No  weakness: No  confusion: No  dysphoric mood: No

## 2025-02-25 ENCOUNTER — TELEPHONE (OUTPATIENT)
Dept: FAMILY MEDICINE | Facility: CLINIC | Age: 25
End: 2025-02-25
Payer: COMMERCIAL

## 2025-02-25 DIAGNOSIS — E11.65 TYPE 2 DIABETES MELLITUS WITH HYPERGLYCEMIA, WITHOUT LONG-TERM CURRENT USE OF INSULIN: ICD-10-CM

## 2025-02-25 RX ORDER — TIRZEPATIDE 2.5 MG/.5ML
2.5 INJECTION, SOLUTION SUBCUTANEOUS
Qty: 6 ML | Refills: 0 | Status: SHIPPED | OUTPATIENT
Start: 2025-02-25

## 2025-02-25 NOTE — TELEPHONE ENCOUNTER
----- Message from Nathalie sent at 2/25/2025  3:23 PM CST -----  Regarding: Resend med  [facility]

## 2025-03-10 ENCOUNTER — TELEPHONE (OUTPATIENT)
Dept: FAMILY MEDICINE | Facility: CLINIC | Age: 25
End: 2025-03-10
Payer: COMMERCIAL

## 2025-03-10 DIAGNOSIS — R11.0 NAUSEA: Primary | ICD-10-CM

## 2025-03-10 RX ORDER — ONDANSETRON 4 MG/1
4 TABLET, ORALLY DISINTEGRATING ORAL EVERY 8 HOURS PRN
Qty: 9 TABLET | Refills: 0 | Status: SHIPPED | OUTPATIENT
Start: 2025-03-10

## 2025-03-10 RX ORDER — ONDANSETRON 4 MG/1
4 TABLET, ORALLY DISINTEGRATING ORAL EVERY 8 HOURS PRN
COMMUNITY
End: 2025-03-10 | Stop reason: SDUPTHER

## 2025-03-10 NOTE — TELEPHONE ENCOUNTER
----- Message from Cintia sent at 3/10/2025  2:50 PM CDT -----  Patient wants to know if Noemi would call out Zofran to Carlos in [facility]

## 2025-03-11 ENCOUNTER — HOSPITAL ENCOUNTER (EMERGENCY)
Facility: HOSPITAL | Age: 25
Discharge: HOME | End: 2025-03-11
Attending: INTERNAL MEDICINE
Payer: COMMERCIAL

## 2025-03-11 VITALS
TEMPERATURE: 98 F | HEIGHT: 60 IN | WEIGHT: 213 LBS | SYSTOLIC BLOOD PRESSURE: 132 MMHG | OXYGEN SATURATION: 98 % | HEART RATE: 116 BPM | RESPIRATION RATE: 16 BRPM | BODY MASS INDEX: 41.82 KG/M2 | DIASTOLIC BLOOD PRESSURE: 94 MMHG

## 2025-03-11 DIAGNOSIS — R10.31 RIGHT LOWER QUADRANT ABDOMINAL PAIN: Primary | ICD-10-CM

## 2025-03-11 DIAGNOSIS — R11.0 NAUSEA: ICD-10-CM

## 2025-03-11 DIAGNOSIS — I88.0 NONSPECIFIC MESENTERIC ADENITIS: ICD-10-CM

## 2025-03-11 LAB
ALBUMIN SERPL-MCNC: 4.5 G/DL (ref 3.5–5)
ALBUMIN/GLOB SERPL: 0.9 RATIO (ref 1.1–2)
ALP SERPL-CCNC: 108 UNIT/L (ref 40–150)
ALT SERPL-CCNC: 31 UNIT/L (ref 0–55)
ANION GAP SERPL CALC-SCNC: 13 MEQ/L
AST SERPL-CCNC: 20 UNIT/L (ref 5–34)
B-HCG UR QL: NEGATIVE
BACTERIA #/AREA URNS AUTO: ABNORMAL /HPF
BASOPHILS # BLD AUTO: 0.06 X10(3)/MCL
BASOPHILS NFR BLD AUTO: 0.5 %
BILIRUB SERPL-MCNC: 0.4 MG/DL
BILIRUB UR QL STRIP.AUTO: NEGATIVE
BUN SERPL-MCNC: 11 MG/DL (ref 7–18.7)
CALCIUM SERPL-MCNC: 9.7 MG/DL (ref 8.4–10.2)
CHLORIDE SERPL-SCNC: 107 MMOL/L (ref 98–107)
CLARITY UR: CLEAR
CO2 SERPL-SCNC: 17 MMOL/L (ref 22–29)
COLOR UR AUTO: YELLOW
CREAT SERPL-MCNC: 0.65 MG/DL (ref 0.55–1.02)
CREAT/UREA NIT SERPL: 17
EOSINOPHIL # BLD AUTO: 0.3 X10(3)/MCL (ref 0–0.9)
EOSINOPHIL NFR BLD AUTO: 2.6 %
ERYTHROCYTE [DISTWIDTH] IN BLOOD BY AUTOMATED COUNT: 13.2 % (ref 11.5–17)
GFR SERPLBLD CREATININE-BSD FMLA CKD-EPI: >60 ML/MIN/1.73/M2
GLOBULIN SER-MCNC: 4.8 GM/DL (ref 2.4–3.5)
GLUCOSE SERPL-MCNC: 138 MG/DL (ref 74–100)
GLUCOSE UR QL STRIP: NEGATIVE
HCT VFR BLD AUTO: 45.8 % (ref 37–47)
HGB BLD-MCNC: 15.7 G/DL (ref 12–16)
HGB UR QL STRIP: ABNORMAL
IMM GRANULOCYTES # BLD AUTO: 0.11 X10(3)/MCL (ref 0–0.04)
IMM GRANULOCYTES NFR BLD AUTO: 1 %
KETONES UR QL STRIP: NEGATIVE
LEUKOCYTE ESTERASE UR QL STRIP: NEGATIVE
LIPASE SERPL-CCNC: 12 U/L
LYMPHOCYTES # BLD AUTO: 2.11 X10(3)/MCL (ref 0.6–4.6)
LYMPHOCYTES NFR BLD AUTO: 18.5 %
MCH RBC QN AUTO: 28.3 PG (ref 27–31)
MCHC RBC AUTO-ENTMCNC: 34.3 G/DL (ref 33–36)
MCV RBC AUTO: 82.5 FL (ref 80–94)
MONOCYTES # BLD AUTO: 1.1 X10(3)/MCL (ref 0.1–1.3)
MONOCYTES NFR BLD AUTO: 9.6 %
MUCOUS THREADS URNS QL MICRO: ABNORMAL /LPF
NEUTROPHILS # BLD AUTO: 7.72 X10(3)/MCL (ref 2.1–9.2)
NEUTROPHILS NFR BLD AUTO: 67.8 %
NITRITE UR QL STRIP: NEGATIVE
NRBC BLD AUTO-RTO: 0 %
PH UR STRIP: 6 [PH]
PLATELET # BLD AUTO: 394 X10(3)/MCL (ref 130–400)
PMV BLD AUTO: 10.9 FL (ref 7.4–10.4)
POTASSIUM SERPL-SCNC: 3.1 MMOL/L (ref 3.5–5.1)
PROT SERPL-MCNC: 9.3 GM/DL (ref 6.4–8.3)
PROT UR QL STRIP: ABNORMAL
RBC # BLD AUTO: 5.55 X10(6)/MCL (ref 4.2–5.4)
RBC #/AREA URNS AUTO: ABNORMAL /HPF
SODIUM SERPL-SCNC: 137 MMOL/L (ref 136–145)
SP GR UR STRIP.AUTO: 1.02 (ref 1–1.03)
SQUAMOUS #/AREA URNS AUTO: ABNORMAL /HPF
UROBILINOGEN UR STRIP-ACNC: 0.2
WBC # BLD AUTO: 11.4 X10(3)/MCL (ref 4.5–11.5)
WBC #/AREA URNS AUTO: ABNORMAL /HPF

## 2025-03-11 PROCEDURE — 80053 COMPREHEN METABOLIC PANEL: CPT | Performed by: INTERNAL MEDICINE

## 2025-03-11 PROCEDURE — 96374 THER/PROPH/DIAG INJ IV PUSH: CPT

## 2025-03-11 PROCEDURE — 85025 COMPLETE CBC W/AUTO DIFF WBC: CPT | Performed by: INTERNAL MEDICINE

## 2025-03-11 PROCEDURE — 96361 HYDRATE IV INFUSION ADD-ON: CPT

## 2025-03-11 PROCEDURE — 99285 EMERGENCY DEPT VISIT HI MDM: CPT | Mod: 25

## 2025-03-11 PROCEDURE — 83690 ASSAY OF LIPASE: CPT | Performed by: INTERNAL MEDICINE

## 2025-03-11 PROCEDURE — 63600175 PHARM REV CODE 636 W HCPCS: Performed by: INTERNAL MEDICINE

## 2025-03-11 PROCEDURE — 81003 URINALYSIS AUTO W/O SCOPE: CPT | Performed by: INTERNAL MEDICINE

## 2025-03-11 PROCEDURE — 81025 URINE PREGNANCY TEST: CPT | Performed by: INTERNAL MEDICINE

## 2025-03-11 RX ORDER — KETOROLAC TROMETHAMINE 30 MG/ML
15 INJECTION, SOLUTION INTRAMUSCULAR; INTRAVENOUS
Status: COMPLETED | OUTPATIENT
Start: 2025-03-11 | End: 2025-03-11

## 2025-03-11 RX ORDER — AMOXICILLIN AND CLAVULANATE POTASSIUM 875; 125 MG/1; MG/1
1 TABLET, FILM COATED ORAL 2 TIMES DAILY
Qty: 20 TABLET | Refills: 0 | Status: SHIPPED | OUTPATIENT
Start: 2025-03-11 | End: 2025-03-21

## 2025-03-11 RX ADMIN — KETOROLAC TROMETHAMINE 15 MG: 30 INJECTION, SOLUTION INTRAMUSCULAR; INTRAVENOUS at 09:03

## 2025-03-11 RX ADMIN — SODIUM CHLORIDE, POTASSIUM CHLORIDE, SODIUM LACTATE AND CALCIUM CHLORIDE 1000 ML: 600; 310; 30; 20 INJECTION, SOLUTION INTRAVENOUS at 09:03

## 2025-03-11 NOTE — ED PROVIDER NOTES
Encounter Date: 3/11/2025  History from patient     History     Chief Complaint   Patient presents with    Abdominal Pain     RUQ abd pain with nausea and diarrhea since Friday. Started weight loss shot last week     HPI    Zora Gallardo is 24 y.o. female who  has a past medical history of Anxiety disorder, unspecified, Depression, HSV-2 (herpes simplex virus 2) infection, Insomnia, Long term use of drug, Mixed anxiety and depressive disorder, and Obesity, unspecified. arrives in ER with c/o Abdominal Pain (RUQ abd pain with nausea and diarrhea since Friday. Started weight loss shot last week)    Review of patient's allergies indicates:  No Known Allergies  Past Medical History:   Diagnosis Date    Anxiety disorder, unspecified     Depression     HSV-2 (herpes simplex virus 2) infection     Insomnia     Long term use of drug     Mixed anxiety and depressive disorder     Obesity, unspecified      Past Surgical History:   Procedure Laterality Date    TONSILLECTOMY  03/2020     Family History   Problem Relation Name Age of Onset    Diabetes Mother      Diabetes Mellitus Mother      Diabetes Maternal Grandmother      Diabetes Maternal Grandfather      Breast cancer Other MGGM         age unknown     Social History[1]  Review of Systems   Constitutional:  Negative for fever.   HENT:  Negative for trouble swallowing and voice change.    Eyes:  Negative for visual disturbance.   Respiratory:  Negative for cough and shortness of breath.    Cardiovascular:  Negative for chest pain.   Gastrointestinal:  Positive for abdominal pain. Negative for diarrhea and vomiting.        Right upper quadrant pain, patient's points to her chest   Genitourinary:  Negative for dysuria and hematuria.   Musculoskeletal:  Negative for back pain and gait problem.   Skin:  Negative for color change and rash.   Neurological:  Negative for headaches.   Psychiatric/Behavioral:  Negative for behavioral problems and sleep disturbance.    All other  systems reviewed and are negative.      Physical Exam     Initial Vitals   BP Pulse Resp Temp SpO2   03/11/25 0827 03/11/25 0824 03/11/25 0824 03/11/25 0824 03/11/25 0824   (!) 142/84 108 16 97.7 °F (36.5 °C) 97 %      MAP       --                Physical Exam    Nursing note and vitals reviewed.  Constitutional: No distress.   HENT:   Head: Atraumatic.   Cardiovascular:  Normal rate and regular rhythm.           Pulmonary/Chest: Breath sounds normal. No respiratory distress.   Abdominal: Abdomen is soft. Bowel sounds are normal. She exhibits no distension. There is abdominal tenderness.   Tenderness in the right lower quadrant There is no rebound.   Musculoskeletal:         General: Normal range of motion.     Neurological: She is alert and oriented to person, place, and time.   Skin: Skin is warm and dry.   Psychiatric: She has a normal mood and affect.         ED Course   Procedures  Orders Placed This Encounter    CT Renal Stone Study ABD Pelvis WO    HCG Qualitative Urine    Urinalysis, Reflex to Urine Culture    Urinalysis, Microscopic    CBC W/ AUTO DIFFERENTIAL    Comp. Metabolic Panel    Lipase    CBC with Differential    Diet NPO    Vital signs    Insert peripheral IV    lactated ringers bolus 1,000 mL    ketorolac injection 15 mg    amoxicillin-clavulanate 875-125mg (AUGMENTIN) 875-125 mg per tablet       Labs Reviewed   URINALYSIS, REFLEX TO URINE CULTURE - Abnormal       Result Value    Color, UA Yellow      Appearance, UA Clear      Specific Gravity, UA 1.025      pH, UA 6.0      Protein, UA 2+ (*)     Glucose, UA Negative      Ketones, UA Negative      Blood, UA 2+ (*)     Bilirubin, UA Negative      Urobilinogen, UA 0.2      Nitrites, UA Negative      Leukocyte Esterase, UA Negative     URINALYSIS, MICROSCOPIC - Abnormal    Bacteria, UA Rare      Mucous, UA Trace (*)     RBC, UA 0-5      WBC, UA 0-2      Squamous Epithelial Cells, UA Rare     COMPREHENSIVE METABOLIC PANEL - Abnormal    Sodium 137       Potassium 3.1 (*)     Chloride 107      CO2 17 (*)     Glucose 138 (*)     Blood Urea Nitrogen 11.0      Creatinine 0.65      Calcium 9.7      Protein Total 9.3 (*)     Albumin 4.5      Globulin 4.8 (*)     Albumin/Globulin Ratio 0.9 (*)     Bilirubin Total 0.4            ALT 31      AST 20      eGFR >60      Anion Gap 13.0      BUN/Creatinine Ratio 17     CBC WITH DIFFERENTIAL - Abnormal    WBC 11.40      RBC 5.55 (*)     Hgb 15.7      Hct 45.8      MCV 82.5      MCH 28.3      MCHC 34.3      RDW 13.2      Platelet 394      MPV 10.9 (*)     Neut % 67.8      Lymph % 18.5      Mono % 9.6      Eos % 2.6      Basophil % 0.5      Imm Grans % 1.0      Neut # 7.72      Lymph # 2.11      Mono # 1.10      Eos # 0.30      Baso # 0.06      Imm Gran # 0.11 (*)     NRBC% 0.0     HCG QUALITATIVE URINE - Normal    hCG Qualitative, Urine Negative     LIPASE - Normal    Lipase Level 12     CBC W/ AUTO DIFFERENTIAL    Narrative:     The following orders were created for panel order CBC W/ AUTO DIFFERENTIAL.  Procedure                               Abnormality         Status                     ---------                               -----------         ------                     CBC with Differential[[id number]]       Abnormal            Final result                 Please view results for these tests on the individual orders.          Imaging Results              CT Renal Stone Study ABD Pelvis WO (Final result)  Result time 03/11/25 10:45:14      Final result by Trevon Reed MD (03/11/25 10:45:14)                   Impression:      1. No obstructive uropathy identified  2. The appendix is believed to be upper normal in size measuring 8-9 mm in diameter without obvious surrounding edema/stranding.  A early appendicitis must be considered.  Evaluation is limited.  Repeat exam with oral contrast would allow further evaluation if clinically indicated  3. Multiple mildly prominent lymph nodes seen within the right lower  quadrant and mid mesentery as well as the anterior pericaval region suggesting a mesenteric adenitis.  4. Findings and other details as above      Electronically signed by: Trevon Reed  Date:    03/11/2025  Time:    10:45               Narrative:    EXAMINATION:  CT RENAL STONE STUDY ABD PELVIS WO    CLINICAL HISTORY:  Flank pain, kidney stone suspected;Right lower quadrant pain;, .    TECHNIQUE:  PATIENT RADIATION DOSE: DLP(mGycm) 535    As per PQRS measures, all CT scans at this facility used dose modulation, iterative reconstruction, and/or weight based dose adjustment when appropriate to reduce radiation dose to as low as reasonably achievable.    COMPARISON:  None available    FINDINGS:  Serial axial images obtained of the abdomen without the administration of IV or oral contrast.  Additional sagittal and coronal reconstructions were performed.Bony structures appear grossly intact.  The liver is diffusely decreased in density compatible steatosis.  The spleen, adrenal glands, and pancreas are grossly within normal limits.  The gallbladder is unremarkable.  The kidneys are relatively symmetric in size.  No hydronephrosis is seen.  No renal or ureteral stone is seen.  The stomach is distended with particulate food matter.  The appendix is believed to be identified and mildly prominent in size measuring 8-9 mm in diameter.  No significant surrounding edema/stranding is seen evaluation is limited secondary to multiple unopacified loops of bowel in the right lower abdomen.  Multiple lymph nodes seen within the right lower quadrant and mid mesenteric fat measuring over 1 cm.  No dilated loops of bowel are identified.  A few lymph nodes measuring up to 1 cm are seen within the right anterior pericaval region.  Gas and feces are seen within the colon.  The uterus is grossly normal in size.  There is a suspect prominent follicle at the left ovary.  The bladder is partially distended with fluid.  No significant free  fluid collection identified.                                       Medications   lactated ringers bolus 1,000 mL (1,000 mLs Intravenous New Bag 3/11/25 0906)   ketorolac injection 15 mg (15 mg Intravenous Given 3/11/25 0905)     Medical Decision Making    Zora Gallardo is 24 y.o. female who  has a past medical history of Anxiety disorder, unspecified, Depression, HSV-2 (herpes simplex virus 2) infection, Insomnia, Long term use of drug, Mixed anxiety and depressive disorder, and Obesity, unspecified. arrives in ER with c/o Abdominal Pain (RUQ abd pain with nausea and diarrhea since Friday. Started weight loss shot last week)    Patient says that she has been hurting in the abdomen since Friday and having nausea and diarrhea.  Wants to her chest, when asked her where exactly it is hurting she points to her chest, and she says I can not describe exactly it is hurting, when asked her if it is breast or chest she says no, she says that she was diagnosed with a border and diabetes because her hemoglobin A1c is 7.1 and they started her on Mounjaro and she got the shot on Tuesday and then Friday she started having abdominal pain nausea and diarrhea.  So she plans to stop taking it.  But during the night she was hurting very bad on the right side so she decided to come to the emergency room.    On examination patient has tenderness in the right lower quadrant, no particular tenderness in the right upper quadrant, she does have some right CVA tenderness, lung sounds are clear.    I am going to do a workup for appendicitis, kidney stones, UTI, cholecystitis, pancreatitis,        Amount and/or Complexity of Data Reviewed  Labs: ordered.  Radiology: ordered.    Risk  Prescription drug management.               ED Course as of 03/11/25 1115   Tue Mar 11, 2025   1103 Patient's CT scan shows borderline appendix, but no inflammatory signs around the appendix, but she does have multiple lymph nodes in the peritoneum, and  possibly mesenteric adenitis. [GQ]   1105 I discuss the findings of the CT scan with the patient, went over in detail about different options, and then after consultation with patient and her grandmother we decided that we are going to put her on Augmentin for mesenteric adenitis and even if it is early appendicitis, and she will watch it for 12-24 hours in his the pain gets better she will continue the antibiotic and finish it, if the pain gets worse she will come back to the emergency room and will do the CT scan with oral and IV contrast to look at the appendix again.  Also I advised her not to take Mounjaro and see if that also helps with her abdominal pain.  I will advise her that I have given her a pain medicine at this time I will recommend not to take anymore pain medicine and see where the pain is going as mentioned if the pain gets worse, if she runs fever, if she starts throwing up she needs to come back to the emergency room for re-evaluation.  Patient and grandmother verbalized understanding and she would rather take the antibiotic and go home then getting admitted in the hospital for observation. [GQ]      ED Course User Index  [GQ] Kristan Dow MD                           Clinical Impression:  Final diagnoses:  [R10.31] Right lower quadrant abdominal pain (Primary)  [I88.0] Nonspecific mesenteric adenitis          ED Disposition Condition    Discharge Stable          ED Prescriptions       Medication Sig Dispense Start Date End Date Auth. Provider    amoxicillin-clavulanate 875-125mg (AUGMENTIN) 875-125 mg per tablet Take 1 tablet by mouth 2 (two) times daily. for 10 days 20 tablet 3/11/2025 3/21/2025 Kristan Dow MD          Follow-up Information       Follow up With Specialties Details Why Contact Info    Mayra Christine DNP Family Medicine In 1 day  [address] [facility] [place] [address]  [phone]                 [1]   Social History  Tobacco Use    Smoking status: Former     Types:  Vaping with nicotine     Start date:      Quit date: 2024     Years since quittin.1     Passive exposure: Current    Smokeless tobacco: Never   Substance Use Topics    Alcohol use: Yes     Comment: ocassionally    Drug use: Never        Kristan Dow MD  25 1118

## 2025-03-11 NOTE — Clinical Note
"Zora Mancilla" Sami was seen and treated in our emergency department on 3/11/2025.  She may return to work on 03/13/2025.       If you have any questions or concerns, please don't hesitate to call.       RN    "

## 2025-03-18 ENCOUNTER — OFFICE VISIT (OUTPATIENT)
Dept: FAMILY MEDICINE | Facility: CLINIC | Age: 25
End: 2025-03-18
Payer: COMMERCIAL

## 2025-03-18 VITALS
HEART RATE: 92 BPM | TEMPERATURE: 97 F | SYSTOLIC BLOOD PRESSURE: 124 MMHG | BODY MASS INDEX: 43.19 KG/M2 | HEIGHT: 60 IN | DIASTOLIC BLOOD PRESSURE: 74 MMHG | OXYGEN SATURATION: 98 % | RESPIRATION RATE: 18 BRPM | WEIGHT: 220 LBS

## 2025-03-18 DIAGNOSIS — R10.31 RIGHT LOWER QUADRANT ABDOMINAL PAIN: ICD-10-CM

## 2025-03-18 DIAGNOSIS — E11.65 TYPE 2 DIABETES MELLITUS WITH HYPERGLYCEMIA, WITHOUT LONG-TERM CURRENT USE OF INSULIN: Primary | ICD-10-CM

## 2025-03-18 PROCEDURE — 3008F BODY MASS INDEX DOCD: CPT | Mod: CPTII,,, | Performed by: NURSE PRACTITIONER

## 2025-03-18 PROCEDURE — 99214 OFFICE O/P EST MOD 30 MIN: CPT | Mod: ,,, | Performed by: NURSE PRACTITIONER

## 2025-03-18 PROCEDURE — 3051F HG A1C>EQUAL 7.0%<8.0%: CPT | Mod: CPTII,,, | Performed by: NURSE PRACTITIONER

## 2025-03-18 PROCEDURE — 3074F SYST BP LT 130 MM HG: CPT | Mod: CPTII,,, | Performed by: NURSE PRACTITIONER

## 2025-03-18 PROCEDURE — 3078F DIAST BP <80 MM HG: CPT | Mod: CPTII,,, | Performed by: NURSE PRACTITIONER

## 2025-03-18 PROCEDURE — 1159F MED LIST DOCD IN RCRD: CPT | Mod: CPTII,,, | Performed by: NURSE PRACTITIONER

## 2025-03-18 PROCEDURE — 1160F RVW MEDS BY RX/DR IN RCRD: CPT | Mod: CPTII,,, | Performed by: NURSE PRACTITIONER

## 2025-03-18 RX ORDER — METFORMIN HYDROCHLORIDE 500 MG/1
500 TABLET, EXTENDED RELEASE ORAL
Qty: 90 TABLET | Refills: 3 | Status: SHIPPED | OUTPATIENT
Start: 2025-03-18 | End: 2026-03-18

## 2025-03-18 NOTE — ASSESSMENT & PLAN NOTE
D/c mounjaro due to abdominal pain after starting   Begin metformin 500mg   Home glucose monitoring  Return in one month and tolerability of metfomrin

## 2025-03-18 NOTE — PROGRESS NOTES
SUBJECTIVE:  Zora Gallardo is a 24 y.o. female here alone for Hospital Follow Up (ER visit: 3/11/25: mesenteric adenitis) and Abdominal Pain (RLQ)      History of Present Illness    CHIEF COMPLAINT:  Patient presents today for follow up of abdominal pain    GASTROINTESTINAL:  She reports right lower quadrant abdominal pain described as wave-like and similar to menstrual cramps with significant tenderness. She has experienced diarrhea for the past 2 weeks. noting food passes through within 5 minutes of consumption. She denies blood in stool.    MEDICATION INTOLERANCES:  She discontinued Mounjaro due to intolerance and expresses concern about restarting the medication. She has not tried metformin in past.       ROS:  General: -fever, +chills, -fatigue, -weight gain, -weight loss  Eyes: -vision changes, -redness, -discharge  ENT: -ear pain, -nasal congestion, -sore throat  Cardiovascular: -chest pain, -palpitations, -lower extremity edema  Respiratory: -cough, -shortness of breath  Gastrointestinal: +abdominal pain, -nausea, -vomiting, +diarrhea, -constipation, -blood in stool  Genitourinary: -dysuria, -hematuria, -frequency  Musculoskeletal: -joint pain, -muscle pain, +difficulty walking, +limb pain  Skin: -rash, -lesion  Neurological: -headache, -dizziness, -numbness, -tingling  Psychiatric: -anxiety, -depression, -sleep difficulty          Edys allergies, medications, history, and problem list were updated as appropriate.      A comprehensive review of symptoms was completed and negative except as noted above.    OBJECTIVE:  Vital signs  Vitals:    03/18/25 1507   BP: 124/74   BP Location: Right arm   Patient Position: Sitting   Pulse: 92   Resp: 18   Temp: 96.8 °F (36 °C)   TempSrc: Temporal   SpO2: 98%   Weight: 99.8 kg (220 lb)   Height: 5' (1.524 m)        Physical Exam  Vitals and nursing note reviewed.   Constitutional:       Appearance: Normal appearance.   HENT:      Head: Normocephalic and  atraumatic.      Right Ear: Tympanic membrane, ear canal and external ear normal.      Left Ear: Tympanic membrane, ear canal and external ear normal.      Nose: Nose normal.      Mouth/Throat:      Mouth: Mucous membranes are moist.      Pharynx: Oropharynx is clear.   Eyes:      Extraocular Movements: Extraocular movements intact.      Conjunctiva/sclera: Conjunctivae normal.      Pupils: Pupils are equal, round, and reactive to light.   Cardiovascular:      Rate and Rhythm: Normal rate and regular rhythm.      Pulses: Normal pulses.      Heart sounds: Normal heart sounds.   Pulmonary:      Effort: Pulmonary effort is normal.      Breath sounds: Normal breath sounds.   Abdominal:      General: Abdomen is flat. Bowel sounds are normal.      Palpations: Abdomen is soft.      Tenderness: There is abdominal tenderness in the right lower quadrant. There is no guarding.       Musculoskeletal:         General: Normal range of motion.      Cervical back: Normal range of motion.   Skin:     General: Skin is warm and dry.      Capillary Refill: Capillary refill takes less than 2 seconds.   Neurological:      General: No focal deficit present.      Mental Status: She is alert.   Psychiatric:         Mood and Affect: Mood normal.         Behavior: Behavior normal.         Thought Content: Thought content normal.         Judgment: Judgment normal.          No visits with results within 1 Day(s) from this visit.   Latest known visit with results is:   Admission on 03/11/2025, Discharged on 03/11/2025   Component Date Value Ref Range Status    hCG Qualitative, Urine 03/11/2025 Negative  Negative Final    Color, UA 03/11/2025 Yellow  Yellow, Light-Yellow, Dark Yellow, Mirna, Straw Final    Appearance, UA 03/11/2025 Clear  Clear Final    Specific Gravity, UA 03/11/2025 1.025  1.005 - 1.030 Final    pH, UA 03/11/2025 6.0  5.0 - 8.5 Final    Protein, UA 03/11/2025 2+ (A)  Negative Final    Glucose, UA 03/11/2025 Negative  Negative,  Normal Final    Ketones, UA 03/11/2025 Negative  Negative Final    Blood, UA 03/11/2025 2+ (A)  Negative Final    Bilirubin, UA 03/11/2025 Negative  Negative Final    Urobilinogen, UA 03/11/2025 0.2  0.2, 1.0, Normal Final    Nitrites, UA 03/11/2025 Negative  Negative Final    Leukocyte Esterase, UA 03/11/2025 Negative  Negative Final    Bacteria, UA 03/11/2025 Rare  None Seen, Rare, Occasional /HPF Final    Mucous, UA 03/11/2025 Trace (A)  None Seen /LPF Final    RBC, UA 03/11/2025 0-5  None Seen, 0-2, 3-5, 0-5 /HPF Final    WBC, UA 03/11/2025 0-2  None Seen, 0-2, 3-5, 0-5 /HPF Final    Squamous Epithelial Cells, UA 03/11/2025 Rare  None Seen, Rare, Occasional, Occ /HPF Final    Sodium 03/11/2025 137  136 - 145 mmol/L Final    Potassium 03/11/2025 3.1 (L)  3.5 - 5.1 mmol/L Final    Chloride 03/11/2025 107  98 - 107 mmol/L Final    CO2 03/11/2025 17 (L)  22 - 29 mmol/L Final    Glucose 03/11/2025 138 (H)  74 - 100 mg/dL Final    Blood Urea Nitrogen 03/11/2025 11.0  7.0 - 18.7 mg/dL Final    Creatinine 03/11/2025 0.65  0.55 - 1.02 mg/dL Final    Calcium 03/11/2025 9.7  8.4 - 10.2 mg/dL Final    Protein Total 03/11/2025 9.3 (H)  6.4 - 8.3 gm/dL Final    Albumin 03/11/2025 4.5  3.5 - 5.0 g/dL Final    Globulin 03/11/2025 4.8 (H)  2.4 - 3.5 gm/dL Final    Albumin/Globulin Ratio 03/11/2025 0.9 (L)  1.1 - 2.0 ratio Final    Bilirubin Total 03/11/2025 0.4  <=1.5 mg/dL Final    ALP 03/11/2025 108  40 - 150 unit/L Final    ALT 03/11/2025 31  0 - 55 unit/L Final    AST 03/11/2025 20  5 - 34 unit/L Final    eGFR 03/11/2025 >60  mL/min/1.73/m2 Final    Estimated GFR calculated using the CKD-EPI creatinine (2021) equation.    Anion Gap 03/11/2025 13.0  mEq/L Final    BUN/Creatinine Ratio 03/11/2025 17   Final    Lipase Level 03/11/2025 12  <=60 U/L Final    WBC 03/11/2025 11.40  4.50 - 11.50 x10(3)/mcL Final    RBC 03/11/2025 5.55 (H)  4.20 - 5.40 x10(6)/mcL Final    Hgb 03/11/2025 15.7  12.0 - 16.0 g/dL Final    Hct 03/11/2025  45.8  37.0 - 47.0 % Final    MCV 03/11/2025 82.5  80.0 - 94.0 fL Final    MCH 03/11/2025 28.3  27.0 - 31.0 pg Final    MCHC 03/11/2025 34.3  33.0 - 36.0 g/dL Final    RDW 03/11/2025 13.2  11.5 - 17.0 % Final    Platelet 03/11/2025 394  130 - 400 x10(3)/mcL Final    MPV 03/11/2025 10.9 (H)  7.4 - 10.4 fL Final    Neut % 03/11/2025 67.8  % Final    Lymph % 03/11/2025 18.5  % Final    Mono % 03/11/2025 9.6  % Final    Eos % 03/11/2025 2.6  % Final    Basophil % 03/11/2025 0.5  % Final    Imm Grans % 03/11/2025 1.0  % Final    Neut # 03/11/2025 7.72  2.1 - 9.2 x10(3)/mcL Final    Lymph # 03/11/2025 2.11  0.6 - 4.6 x10(3)/mcL Final    Mono # 03/11/2025 1.10  0.1 - 1.3 x10(3)/mcL Final    Eos # 03/11/2025 0.30  0 - 0.9 x10(3)/mcL Final    Baso # 03/11/2025 0.06  <=0.2 x10(3)/mcL Final    Imm Gran # 03/11/2025 0.11 (H)  0.00 - 0.04 x10(3)/mcL Final    NRBC% 03/11/2025 0.0  % Final          ASSESSMENT/PLAN:  Assessment & Plan    E11.65 Type 2 diabetes mellitus with hyperglycemia, without long-term current use of insulin  R10.31 Right lower quadrant abdominal pain    IMPRESSION:  - Assessed ongoing abdominal pain, likely related to recent mesenteric inflammation.   - Evaluated diabetes management, initiated Metformin for glycemic control   - Determined need for glucose monitoring to guide treatment decisions.    E11.65 TYPE 2 DIABETES MELLITUS WITH HYPERGLYCEMIA, WITHOUT LONG-TERM CURRENT USE OF INSULIN:  - Patient to monitor and record glucose levels regularly.  - Bring glucose log to next appointment.  - Started Metformin 500 mg daily.  - May cause initial diarrhea, expected to improve within 2 weeks.  - If diarrhea persists beyond 2 weeks, contact office to discuss alternative options.  - Informed patient about potential side effects of Metformin, including initial GI disturbances.  - Follow up in 1 month.  - Will reassess Metformin tolerance at follow-up visit.    R10.31 RIGHT LOWER QUADRANT ABDOMINAL PAIN:  - Follow up  in 1 month.  - Will reassess abdominal pain resolution at follow-up visit.        1. Type 2 diabetes mellitus with hyperglycemia, without long-term current use of insulin  Assessment & Plan:  D/c mounjaro due to abdominal pain after starting   Begin metformin 500mg   Home glucose monitoring  Return in one month and tolerability of metfomrin     Orders:  -     metFORMIN (GLUCOPHAGE-XR) 500 MG ER 24hr tablet; Take 1 tablet (500 mg total) by mouth daily with breakfast.  Dispense: 90 tablet; Refill: 3    2. Right lower quadrant abdominal pain  Assessment & Plan:  Pain slightly improved however still ongoing especially to right lower quadrant. She did take augmentin bid for 10days. No fever but did experience chills. She will notify if pain worsens. Continue to d/c mounjaro. Diarrhea ongoing but no further episodes today            Follow Up:  Follow up in about 4 weeks (around 4/15/2025).    If a referral was sent and you are not notified and scheduled with specialist within 2 weeks, please notify office to ensure specialty appointment is scheduled in a timely manner.   Discussed the diagnosis, prognosis, plan of care, chronic nature of and indications for, risks and benefits of treatment for conditions.  Continue all medications as prescribed unless otherwise noted.   Call if patient develops other symptoms or if not better in 2-3 days and sooner if worse. Emphasized the importance of compliance with all recommendations, including medication use and timely follow up. Instructed to return as noted be or sooner if patient develops any other problems or if there are any other additional questions or concerns.      This note was generated with the assistance of ambient listening technology. Verbal consent was obtained by the patient and accompanying visitor(s) for the recording of patient appointment to facilitate this note. I attest to having reviewed and edited the generated note for accuracy, though some syntax or  spelling errors may persist. Please contact the author of this note for any clarification.         Answers submitted by the patient for this visit:  Review of Systems Questionnaire (Submitted on 3/17/2025)  activity change: No  unexpected weight change: No  neck pain: No  hearing loss: No  rhinorrhea: No  trouble swallowing: No  eye discharge: No  visual disturbance: No  chest tightness: No  wheezing: No  chest pain: No  palpitations: No  blood in stool: No  constipation: No  vomiting: No  diarrhea: Yes  polydipsia: No  polyuria: No  difficulty urinating: No  hematuria: No  menstrual problem: No  dysuria: No  joint swelling: No  arthralgias: No  headaches: No  weakness: No  confusion: No  dysphoric mood: No

## 2025-03-18 NOTE — ASSESSMENT & PLAN NOTE
Pain slightly improved however still ongoing especially to right lower quadrant. She did take augmentin bid for 10days. No fever but did experience chills. She will notify if pain worsens. Continue to d/c mounjaro. Diarrhea ongoing but no further episodes today